# Patient Record
Sex: FEMALE | Race: WHITE | HISPANIC OR LATINO | Employment: FULL TIME | URBAN - METROPOLITAN AREA
[De-identification: names, ages, dates, MRNs, and addresses within clinical notes are randomized per-mention and may not be internally consistent; named-entity substitution may affect disease eponyms.]

---

## 2022-09-15 DIAGNOSIS — F43.22 ADJUSTMENT DISORDER WITH ANXIETY: ICD-10-CM

## 2022-09-15 RX ORDER — ALPRAZOLAM 0.25 MG/1
0.25 TABLET ORAL DAILY PRN
Qty: 30 TABLET | Refills: 0 | Status: SHIPPED | OUTPATIENT
Start: 2022-09-15 | End: 2023-01-23

## 2023-01-22 DIAGNOSIS — F43.22 ADJUSTMENT DISORDER WITH ANXIETY: ICD-10-CM

## 2023-01-22 DIAGNOSIS — B00.9 HSV-1 (HERPES SIMPLEX VIRUS 1) INFECTION: ICD-10-CM

## 2023-01-23 DIAGNOSIS — B00.9 HSV-1 (HERPES SIMPLEX VIRUS 1) INFECTION: ICD-10-CM

## 2023-01-23 DIAGNOSIS — F43.22 ADJUSTMENT DISORDER WITH ANXIETY: ICD-10-CM

## 2023-01-23 DIAGNOSIS — L25.5 RHUS DERMATITIS: ICD-10-CM

## 2023-01-23 DIAGNOSIS — E03.9 HYPOTHYROIDISM, UNSPECIFIED TYPE: ICD-10-CM

## 2023-01-23 RX ORDER — LEVOTHYROXINE SODIUM 112 UG/1
112 TABLET ORAL
Qty: 90 TABLET | Refills: 0 | Status: CANCELLED | OUTPATIENT
Start: 2023-01-23

## 2023-01-23 RX ORDER — ALPRAZOLAM 0.25 MG/1
TABLET ORAL
Qty: 30 TABLET | Refills: 0 | Status: SHIPPED | OUTPATIENT
Start: 2023-01-23

## 2023-01-23 RX ORDER — VALACYCLOVIR HYDROCHLORIDE 1 G/1
TABLET, FILM COATED ORAL
Qty: 30 TABLET | Refills: 0 | Status: CANCELLED | OUTPATIENT
Start: 2023-01-23 | End: 2023-02-22

## 2023-01-23 RX ORDER — ALPRAZOLAM 0.25 MG/1
0.25 TABLET ORAL DAILY PRN
Qty: 30 TABLET | Refills: 0 | Status: CANCELLED | OUTPATIENT
Start: 2023-01-23

## 2023-01-23 RX ORDER — VALACYCLOVIR HYDROCHLORIDE 1 G/1
TABLET, FILM COATED ORAL
Qty: 30 TABLET | Refills: 0 | Status: SHIPPED | OUTPATIENT
Start: 2023-01-23 | End: 2023-02-22

## 2023-01-26 ENCOUNTER — HOSPITAL ENCOUNTER (OUTPATIENT)
Dept: GASTROENTEROLOGY | Facility: AMBULARY SURGERY CENTER | Age: 43
Setting detail: OUTPATIENT SURGERY
End: 2023-01-26
Attending: INTERNAL MEDICINE

## 2023-01-26 ENCOUNTER — ANESTHESIA EVENT (OUTPATIENT)
Dept: GASTROENTEROLOGY | Facility: AMBULARY SURGERY CENTER | Age: 43
End: 2023-01-26

## 2023-01-26 ENCOUNTER — ANESTHESIA (OUTPATIENT)
Dept: GASTROENTEROLOGY | Facility: AMBULARY SURGERY CENTER | Age: 43
End: 2023-01-26

## 2023-01-26 VITALS
BODY MASS INDEX: 38.8 KG/M2 | OXYGEN SATURATION: 99 % | RESPIRATION RATE: 18 BRPM | TEMPERATURE: 99 F | SYSTOLIC BLOOD PRESSURE: 138 MMHG | DIASTOLIC BLOOD PRESSURE: 82 MMHG | HEART RATE: 78 BPM | WEIGHT: 219 LBS | HEIGHT: 63 IN

## 2023-01-26 DIAGNOSIS — Z12.11 SCREENING FOR COLON CANCER: ICD-10-CM

## 2023-01-26 RX ORDER — LIDOCAINE HYDROCHLORIDE 10 MG/ML
INJECTION, SOLUTION EPIDURAL; INFILTRATION; INTRACAUDAL; PERINEURAL AS NEEDED
Status: DISCONTINUED | OUTPATIENT
Start: 2023-01-26 | End: 2023-01-26

## 2023-01-26 RX ORDER — PROPOFOL 10 MG/ML
INJECTION, EMULSION INTRAVENOUS AS NEEDED
Status: DISCONTINUED | OUTPATIENT
Start: 2023-01-26 | End: 2023-01-26

## 2023-01-26 RX ORDER — SODIUM CHLORIDE, SODIUM LACTATE, POTASSIUM CHLORIDE, CALCIUM CHLORIDE 600; 310; 30; 20 MG/100ML; MG/100ML; MG/100ML; MG/100ML
INJECTION, SOLUTION INTRAVENOUS CONTINUOUS PRN
Status: DISCONTINUED | OUTPATIENT
Start: 2023-01-26 | End: 2023-01-26

## 2023-01-26 RX ADMIN — PROPOFOL 100 MG: 10 INJECTION, EMULSION INTRAVENOUS at 13:14

## 2023-01-26 RX ADMIN — PROPOFOL 50 MG: 10 INJECTION, EMULSION INTRAVENOUS at 13:29

## 2023-01-26 RX ADMIN — SODIUM CHLORIDE, SODIUM LACTATE, POTASSIUM CHLORIDE, AND CALCIUM CHLORIDE: .6; .31; .03; .02 INJECTION, SOLUTION INTRAVENOUS at 13:10

## 2023-01-26 RX ADMIN — PROPOFOL 50 MG: 10 INJECTION, EMULSION INTRAVENOUS at 13:23

## 2023-01-26 RX ADMIN — PROPOFOL 50 MG: 10 INJECTION, EMULSION INTRAVENOUS at 13:17

## 2023-01-26 RX ADMIN — LIDOCAINE HYDROCHLORIDE 50 MG: 10 INJECTION, SOLUTION EPIDURAL; INFILTRATION; INTRACAUDAL at 13:14

## 2023-01-26 RX ADMIN — PROPOFOL 50 MG: 10 INJECTION, EMULSION INTRAVENOUS at 13:26

## 2023-01-26 RX ADMIN — PROPOFOL 50 MG: 10 INJECTION, EMULSION INTRAVENOUS at 13:32

## 2023-01-26 RX ADMIN — PROPOFOL 50 MG: 10 INJECTION, EMULSION INTRAVENOUS at 13:20

## 2023-01-26 NOTE — H&P
History and Physical -  [facility]  Tiffany Parag 38 y o  female MRN: [id number]                  HPI: Regla Marte is a 38y o  year old female who presents for open access screening colonoscopy  REVIEW OF SYSTEMS: Per the HPI, and otherwise unremarkable      Historical Information   Past Medical History:   Diagnosis Date   • Anxiety    • Hypothyroidism    • Low back pain    • Lyme disease    • Migraine    • Obesity    • Polycystic ovarian syndrome    • Tachycardia    • Varicella    • Vertigo    • Viral gastroenteritis      Past Surgical History:   Procedure Laterality Date   • APPENDECTOMY     • SHOULDER SURGERY Left     with hardware   • TOOTH EXTRACTION       Social History   Social History     Substance and Sexual Activity   Alcohol Use Yes    Comment: socially     Social History     Substance and Sexual Activity   Drug Use Never     Social History     Tobacco Use   Smoking Status Never   Smokeless Tobacco Never     Family History   Problem Relation Age of Onset   • Hypertension Brother    • Hyperlipidemia Brother    • Diabetes Maternal Grandmother    • Diabetes Paternal Grandmother    • Autism spectrum disorder Family    • Bipolar disorder Family    • Diabetes Family    • Varicose Veins Family    • Hypertension Mother    • No Known Problems Son    • No Known Problems Son    • No Known Problems Brother        Meds/Allergies       Current Outpatient Medications:   •  ALPRAZolam (XANAX) 0 25 mg tablet  •  levothyroxine 112 mcg tablet  •  valACYclovir (VALTREX) 1,000 mg tablet  •  brompheniramine-pseudoephedrine-DM 30-2-10 MG/5ML syrup  •  methylPREDNISolone 4 MG tablet therapy pack  •  triamcinolone (KENALOG) 0 1 % cream    Allergies   Allergen Reactions   • Shellfish-Derived Products - Food Allergy GI Intolerance       Objective     /91   Pulse 91   Temp (!) 97 3 °F (36 3 °C) (Temporal)   Resp 17   Ht 5' 3" (1 6 m)   Wt 99 3 kg (219 lb)   SpO2 96%   BMI 38 79 kg/m² PHYSICAL EXAM    Gen: NAD  Head: NCAT  CV: RRR  CHEST: Clear  ABD: soft, NT/ND  EXT: no edema      ASSESSMENT/PLAN:  This is a 38y o  year old female here for colonoscopy, and she is stable and optimized for her procedure

## 2023-01-26 NOTE — ANESTHESIA POSTPROCEDURE EVALUATION
Post-Op Assessment Note    CV Status:  Stable  Pain Score: 0    Pain management: adequate     Mental Status:  Alert and awake   Hydration Status:  Euvolemic   PONV Controlled:  Controlled   Airway Patency:  Patent      Post Op Vitals Reviewed: Yes      Staff: Anesthesiologist, CRNA         No notable events documented      /73 (01/26/23 1334)    Temp 99 °F (37 2 °C) (01/26/23 1334)    Pulse 88 (01/26/23 1334)   Resp 22 (01/26/23 1334)    SpO2 97 % (01/26/23 1334)

## 2023-02-15 ENCOUNTER — HOSPITAL ENCOUNTER (OUTPATIENT)
Dept: MAMMOGRAPHY | Facility: HOSPITAL | Age: 43
Discharge: HOME | End: 2023-02-15

## 2023-02-15 VITALS — WEIGHT: 218.92 LBS | BODY MASS INDEX: 38.79 KG/M2 | HEIGHT: 63 IN

## 2023-02-15 DIAGNOSIS — Z12.31 ENCOUNTER FOR SCREENING MAMMOGRAM FOR MALIGNANT NEOPLASM OF BREAST: ICD-10-CM

## 2023-02-22 ENCOUNTER — TELEPHONE (OUTPATIENT)
Dept: OBGYN CLINIC | Facility: MEDICAL CENTER | Age: 43
End: 2023-02-22

## 2023-02-22 ENCOUNTER — TELEPHONE (OUTPATIENT)
Dept: FAMILY MEDICINE CLINIC | Facility: CLINIC | Age: 43
End: 2023-02-22

## 2023-02-22 NOTE — TELEPHONE ENCOUNTER
Pt needs f/u There are no mammo recommendations for this study. and does not want to wait 3 weeks.  I gave her breast center #

## 2023-02-22 NOTE — TELEPHONE ENCOUNTER
PT hasn't heard back with test results yet. Is scheduled for a mammogram but the appt isn't until the end of March. She is concerned with the wait time and wants to speak with someone about her results.

## 2023-03-06 DIAGNOSIS — F43.22 ADJUSTMENT DISORDER WITH ANXIETY: ICD-10-CM

## 2023-03-06 RX ORDER — ALPRAZOLAM 0.25 MG/1
0.25 TABLET ORAL DAILY PRN
Qty: 30 TABLET | Refills: 0 | Status: SHIPPED | OUTPATIENT
Start: 2023-03-06

## 2023-03-06 NOTE — TELEPHONE ENCOUNTER
Reviewed patient's chart to fill for PCP who is not currently in the office  After review of September note, there was a recommendation to hold refills on the medication  There was a refill on 1/23/2023 without any other recommendations  I will give patient 30 days worth of medication, this should be followed up with PCP after

## 2023-03-13 NOTE — ANESTHESIA PREPROCEDURE EVALUATION
Procedure:  COLONOSCOPY    Relevant Problems   CARDIO   (+) Mixed hyperlipidemia      ENDO   (+) Hypothyroidism      Obesity (BMI 38)  Physical Exam    Airway    Mallampati score: II  TM Distance: >3 FB  Neck ROM: full     Dental       Cardiovascular      Pulmonary      Other Findings        Anesthesia Plan  ASA Score- 2     Anesthesia Type- IV sedation with anesthesia with ASA Monitors  Additional Monitors:   Airway Plan:           Plan Factors-Exercise tolerance (METS): >4 METS  Chart reviewed  Existing labs reviewed  Patient summary reviewed  Induction- intravenous  Postoperative Plan-     Informed Consent- Anesthetic plan and risks discussed with patient  I personally reviewed this patient with the CRNA  Discussed and agreed on the Anesthesia Plan with the CRNA  Berry Stanley

## 2023-03-29 ENCOUNTER — HOSPITAL ENCOUNTER (OUTPATIENT)
Dept: ULTRASOUND IMAGING | Facility: CLINIC | Age: 43
Discharge: HOME | End: 2023-03-29

## 2023-03-29 ENCOUNTER — HOSPITAL ENCOUNTER (OUTPATIENT)
Dept: MAMMOGRAPHY | Facility: CLINIC | Age: 43
Discharge: HOME | End: 2023-03-29

## 2023-03-29 VITALS — WEIGHT: 219 LBS | BODY MASS INDEX: 38.8 KG/M2 | HEIGHT: 63 IN

## 2023-03-29 DIAGNOSIS — R92.8 ABNORMAL MAMMOGRAM: ICD-10-CM

## 2023-05-17 DIAGNOSIS — F43.22 ADJUSTMENT DISORDER WITH ANXIETY: ICD-10-CM

## 2023-05-17 NOTE — TELEPHONE ENCOUNTER
Left a detailed message for patient to call back and schedule a follow up for controlled medication

## 2023-05-19 DIAGNOSIS — E03.9 HYPOTHYROIDISM, UNSPECIFIED TYPE: Primary | ICD-10-CM

## 2023-05-19 DIAGNOSIS — E78.2 MIXED HYPERLIPIDEMIA: ICD-10-CM

## 2023-05-19 RX ORDER — ALPRAZOLAM 0.25 MG/1
0.25 TABLET ORAL DAILY PRN
Qty: 30 TABLET | Refills: 0 | OUTPATIENT
Start: 2023-05-19

## 2023-05-23 NOTE — TELEPHONE ENCOUNTER
Patient called back and scheduled for the next available appointment - 6/7  She would like to know if it is possible to have a short supply to last until her visit

## 2023-05-24 DIAGNOSIS — F43.22 ADJUSTMENT DISORDER WITH ANXIETY: ICD-10-CM

## 2023-05-24 RX ORDER — ALPRAZOLAM 0.25 MG/1
0.25 TABLET ORAL DAILY PRN
Qty: 30 TABLET | Refills: 0 | Status: SHIPPED | OUTPATIENT
Start: 2023-05-24 | End: 2023-06-07 | Stop reason: SDUPTHER

## 2023-06-07 ENCOUNTER — OFFICE VISIT (OUTPATIENT)
Dept: FAMILY MEDICINE CLINIC | Facility: CLINIC | Age: 43
End: 2023-06-07
Payer: COMMERCIAL

## 2023-06-07 VITALS
DIASTOLIC BLOOD PRESSURE: 82 MMHG | OXYGEN SATURATION: 98 % | HEART RATE: 92 BPM | WEIGHT: 223 LBS | HEIGHT: 63 IN | SYSTOLIC BLOOD PRESSURE: 125 MMHG | BODY MASS INDEX: 39.51 KG/M2

## 2023-06-07 DIAGNOSIS — F43.22 ADJUSTMENT DISORDER WITH ANXIETY: Primary | ICD-10-CM

## 2023-06-07 DIAGNOSIS — E03.9 HYPOTHYROIDISM, UNSPECIFIED TYPE: ICD-10-CM

## 2023-06-07 DIAGNOSIS — E78.2 MIXED HYPERLIPIDEMIA: ICD-10-CM

## 2023-06-07 DIAGNOSIS — R73.9 ELEVATED BLOOD SUGAR: ICD-10-CM

## 2023-06-07 PROCEDURE — 99213 OFFICE O/P EST LOW 20 MIN: CPT | Performed by: FAMILY MEDICINE

## 2023-06-07 RX ORDER — ALPRAZOLAM 0.25 MG/1
0.25 TABLET ORAL DAILY PRN
Qty: 30 TABLET | Refills: 2 | Status: SHIPPED | OUTPATIENT
Start: 2023-06-07

## 2023-06-07 NOTE — PROGRESS NOTES
Subjective:      Patient ID: Heidi Shaw is a 37 y o  female  Thyroid Problem  Presents for follow-up visit  Patient reports no palpitations or tremors  Symptom course: On levothyroxine 112 mcg  Anxiety  Presents for follow-up visit  Patient reports no excessive worry, palpitations or panic  Symptoms occur occasionally  Compliance with medications:  Xanax 0 25 mg nightly as needed  Past Medical History:   Diagnosis Date   • Anxiety    • Hypothyroidism    • Low back pain    • Lyme disease    • Migraine    • Obesity    • Polycystic ovarian syndrome    • Tachycardia    • Varicella    • Vertigo    • Viral gastroenteritis        Family History   Problem Relation Age of Onset   • Hypertension Mother    • Diabetes Maternal Grandmother    • Diabetes Paternal Grandmother    • Hypertension Brother    • Hyperlipidemia Brother    • No Known Problems Brother    • No Known Problems Son    • No Known Problems Son    • No Known Problems Maternal Aunt    • No Known Problems Maternal Aunt    • No Known Problems Maternal Aunt    • No Known Problems Maternal Aunt    • No Known Problems Maternal Aunt    • Autism spectrum disorder Family    • Bipolar disorder Family    • Diabetes Family    • Varicose Veins Family        Past Surgical History:   Procedure Laterality Date   • APPENDECTOMY     • SHOULDER SURGERY Left     with hardware   • TOOTH EXTRACTION          reports that she has never smoked  She has never used smokeless tobacco  She reports current alcohol use  She reports that she does not use drugs        Current Outpatient Medications:   •  ALPRAZolam (XANAX) 0 25 mg tablet, Take 1 tablet (0 25 mg total) by mouth daily as needed for anxiety, Disp: 30 tablet, Rfl: 2  •  levothyroxine 112 mcg tablet, TAKE 1 TABLET (112 MCG TOTAL) BY MOUTH DAILY IN THE EARLY MORNING, Disp: 90 tablet, Rfl: 0  •  triamcinolone (KENALOG) 0 1 % cream, Apply topically 2 (two) times a day (Patient not taking: Reported on 9/1/2022), Disp: "30 g, Rfl: 0  •  valACYclovir (VALTREX) 1,000 mg tablet, TAKE 1 TABLET BY MOUTH TWICE A DAY FOR 3 DAYS, Disp: 30 tablet, Rfl: 0    The following portions of the patient's history were reviewed and updated as appropriate: allergies, current medications, past family history, past medical history, past social history, past surgical history and problem list     Review of Systems   Cardiovascular: Negative for palpitations  Neurological: Negative for tremors  Objective:    /82   Pulse 92   Ht 5' 3\" (1 6 m)   Wt 101 kg (223 lb)   SpO2 98%   BMI 39 50 kg/m²      Physical Exam  Constitutional:       Appearance: Normal appearance  Cardiovascular:      Heart sounds: Normal heart sounds  Pulmonary:      Breath sounds: Normal breath sounds  Neurological:      Mental Status: She is oriented to person, place, and time  Psychiatric:         Mood and Affect: Mood normal            No results found for this or any previous visit (from the past 1008 hour(s))  Assessment/Plan:             Problem List Items Addressed This Visit        Endocrine    Hypothyroidism     Patient is due for metabolic labs including TSH  Currently on levothyroxine 112 mcg  We will continue same  Patient will be contacted with the results and medication dose adjustment if needed  Relevant Orders    TSH, 3rd generation with Free T4 reflex       Other    Adjustment disorder with anxiety - Primary     Symptoms responding well to intermittent use of Xanax 0 25 milligram   Continue same  PDMP checked  Medication refill called in  Relevant Medications    ALPRAZolam (XANAX) 0 25 mg tablet    Elevated blood sugar    Relevant Orders    Hemoglobin A1C    Comprehensive metabolic panel    Hemoglobin A1C    Mixed hyperlipidemia     Patient due for labs  Encouraged to continue with low-fat diet/exercise    Continue monitoring with metabolic panel at 6-month interval          Relevant Orders    Lipid panel       "

## 2023-06-07 NOTE — ASSESSMENT & PLAN NOTE
Symptoms responding well to intermittent use of Xanax 0 25 milligram   Continue same  PDMP checked  Medication refill called in

## 2023-06-07 NOTE — ASSESSMENT & PLAN NOTE
Patient due for labs  Encouraged to continue with low-fat diet/exercise    Continue monitoring with metabolic panel at 6-month interval

## 2023-06-07 NOTE — ASSESSMENT & PLAN NOTE
Patient is due for metabolic labs including TSH  Currently on levothyroxine 112 mcg  We will continue same  Patient will be contacted with the results and medication dose adjustment if needed

## 2023-06-09 ENCOUNTER — TELEPHONE (OUTPATIENT)
Dept: FAMILY MEDICINE CLINIC | Facility: CLINIC | Age: 43
End: 2023-06-09

## 2023-06-09 NOTE — TELEPHONE ENCOUNTER
Luisa Singh said she spoke to her pharmacy and they had sent a request for an alternative for her Xanax because of a dosage issue.

## 2023-06-12 DIAGNOSIS — F43.22 ADJUSTMENT DISORDER WITH ANXIETY: ICD-10-CM

## 2023-06-12 RX ORDER — ALPRAZOLAM 0.25 MG/1
0.25 TABLET ORAL DAILY PRN
Qty: 30 TABLET | Refills: 0 | Status: CANCELLED | OUTPATIENT
Start: 2023-06-12

## 2023-06-20 ENCOUNTER — TELEPHONE (OUTPATIENT)
Dept: FAMILY MEDICINE CLINIC | Facility: CLINIC | Age: 43
End: 2023-06-20

## 2023-06-20 ENCOUNTER — OFFICE VISIT (OUTPATIENT)
Dept: URGENT CARE | Facility: CLINIC | Age: 43
End: 2023-06-20
Payer: COMMERCIAL

## 2023-06-20 ENCOUNTER — APPOINTMENT (OUTPATIENT)
Dept: LAB | Facility: CLINIC | Age: 43
End: 2023-06-20
Payer: COMMERCIAL

## 2023-06-20 VITALS
SYSTOLIC BLOOD PRESSURE: 134 MMHG | DIASTOLIC BLOOD PRESSURE: 83 MMHG | HEIGHT: 63 IN | RESPIRATION RATE: 16 BRPM | OXYGEN SATURATION: 99 % | WEIGHT: 220 LBS | TEMPERATURE: 98.7 F | BODY MASS INDEX: 38.98 KG/M2

## 2023-06-20 DIAGNOSIS — E78.2 MIXED HYPERLIPIDEMIA: ICD-10-CM

## 2023-06-20 DIAGNOSIS — R73.9 ELEVATED BLOOD SUGAR: ICD-10-CM

## 2023-06-20 DIAGNOSIS — E03.9 HYPOTHYROIDISM, UNSPECIFIED TYPE: ICD-10-CM

## 2023-06-20 DIAGNOSIS — L30.9 DERMATITIS: Primary | ICD-10-CM

## 2023-06-20 LAB
ALBUMIN SERPL BCP-MCNC: 4 G/DL (ref 3.5–5)
ALP SERPL-CCNC: 69 U/L (ref 34–104)
ALT SERPL W P-5'-P-CCNC: 17 U/L (ref 7–52)
ANION GAP SERPL CALCULATED.3IONS-SCNC: 5 MMOL/L
AST SERPL W P-5'-P-CCNC: 13 U/L (ref 13–39)
BILIRUB SERPL-MCNC: 0.32 MG/DL (ref 0.2–1)
BUN SERPL-MCNC: 11 MG/DL (ref 5–25)
CALCIUM SERPL-MCNC: 8.8 MG/DL (ref 8.4–10.2)
CHLORIDE SERPL-SCNC: 107 MMOL/L (ref 96–108)
CHOLEST SERPL-MCNC: 197 MG/DL
CO2 SERPL-SCNC: 26 MMOL/L (ref 21–32)
CREAT SERPL-MCNC: 0.68 MG/DL (ref 0.6–1.3)
GFR SERPL CREATININE-BSD FRML MDRD: 105 ML/MIN/1.73SQ M
GLUCOSE P FAST SERPL-MCNC: 100 MG/DL (ref 65–99)
HDLC SERPL-MCNC: 53 MG/DL
LDLC SERPL CALC-MCNC: 119 MG/DL (ref 0–100)
NONHDLC SERPL-MCNC: 144 MG/DL
POTASSIUM SERPL-SCNC: 4.4 MMOL/L (ref 3.5–5.3)
PROT SERPL-MCNC: 6.7 G/DL (ref 6.4–8.4)
SODIUM SERPL-SCNC: 138 MMOL/L (ref 135–147)
TRIGL SERPL-MCNC: 123 MG/DL
TSH SERPL DL<=0.05 MIU/L-ACNC: 4.21 UIU/ML (ref 0.45–4.5)

## 2023-06-20 PROCEDURE — 83036 HEMOGLOBIN GLYCOSYLATED A1C: CPT

## 2023-06-20 PROCEDURE — 80061 LIPID PANEL: CPT

## 2023-06-20 PROCEDURE — 36415 COLL VENOUS BLD VENIPUNCTURE: CPT

## 2023-06-20 PROCEDURE — 99213 OFFICE O/P EST LOW 20 MIN: CPT

## 2023-06-20 PROCEDURE — 80053 COMPREHEN METABOLIC PANEL: CPT

## 2023-06-20 PROCEDURE — 84443 ASSAY THYROID STIM HORMONE: CPT

## 2023-06-20 RX ORDER — METHYLPREDNISOLONE 4 MG/1
TABLET ORAL
Qty: 21 TABLET | Refills: 0 | Status: SHIPPED | OUTPATIENT
Start: 2023-06-20

## 2023-06-20 NOTE — TELEPHONE ENCOUNTER
----- Message from Dk Serna MD sent at 6/20/2023 12:51 PM EDT -----  Please call patient with results  Lipid panel has improved  Thyroid stable  Continue with the current dose of levothyroxine  A1c is pending

## 2023-06-20 NOTE — PATIENT INSTRUCTIONS
Steroids will help reduce inflammation    Follow up with PCP in 3-5 days  Proceed to ER if symptoms worsen

## 2023-06-20 NOTE — PROGRESS NOTES
[facility]        NAME: Abimbola Victor is a 37 y o  female  : 1980    MRN: [id number]  DATE: 2023  TIME: 1:29 PM    Assessment and Plan   Dermatitis [L30 9]  1  Dermatitis  methylPREDNISolone 4 MG tablet therapy pack            Patient Instructions   Steroids will help reduce inflammation    Follow up with PCP in 3-5 days  Proceed to ER if symptoms worsen  Chief Complaint     Chief Complaint   Patient presents with   • Rash     Rash on hands and face- started Saturday  No yard work  No new products used OTC benadryl gel          History of Present Illness       Rash starting on hands about 3 days ago  Reports no specific instance where she was exposed to an allergen  States since then it had spread to her face and her fingers became more swollen  States she had taken benadryl last evening and also cream on hands this morning without relief  Review of Systems   Review of Systems   Constitutional: Negative for chills and fever  HENT: Negative for ear pain and sore throat  Eyes: Negative for pain and visual disturbance  Respiratory: Negative for cough and shortness of breath  Cardiovascular: Negative for chest pain and palpitations  Gastrointestinal: Negative for abdominal pain and vomiting  Genitourinary: Negative for dysuria and hematuria  Musculoskeletal: Negative for arthralgias and back pain  Skin: Positive for rash  Negative for color change  Neurological: Negative for dizziness, seizures, syncope, weakness and numbness  All other systems reviewed and are negative          Current Medications       Current Outpatient Medications:   •  ALPRAZolam (XANAX) 0 25 mg tablet, Take 1 tablet (0 25 mg total) by mouth daily as needed for anxiety, Disp: 30 tablet, Rfl: 2  •  levothyroxine 112 mcg tablet, TAKE 1 TABLET (112 MCG TOTAL) BY MOUTH DAILY IN THE EARLY MORNING, Disp: 90 tablet, Rfl: 0  •  methylPREDNISolone 4 MG tablet therapy pack, There are 21 total tablets: "6 tablets on day 1, 5 tablets on day 2, 4 tablets on day 3, 3 tablets on day 4, 2 tablets on day 5, 1 tablet on day 6 , Disp: 21 tablet, Rfl: 0  •  triamcinolone (KENALOG) 0 1 % cream, Apply topically 2 (two) times a day (Patient not taking: Reported on 9/1/2022), Disp: 30 g, Rfl: 0  •  valACYclovir (VALTREX) 1,000 mg tablet, TAKE 1 TABLET BY MOUTH TWICE A DAY FOR 3 DAYS, Disp: 30 tablet, Rfl: 0    Current Allergies     Allergies as of 06/20/2023 - Reviewed 06/20/2023   Allergen Reaction Noted   • Shellfish-derived products - food allergy GI Intolerance 01/22/2021            The following portions of the patient's history were reviewed and updated as appropriate: allergies, current medications, past family history, past medical history, past social history, past surgical history and problem list      Past Medical History:   Diagnosis Date   • Anxiety    • Hypothyroidism    • Low back pain    • Lyme disease    • Migraine    • Obesity    • Polycystic ovarian syndrome    • Tachycardia    • Varicella    • Vertigo    • Viral gastroenteritis        Past Surgical History:   Procedure Laterality Date   • APPENDECTOMY     • SHOULDER SURGERY Left     with hardware   • TOOTH EXTRACTION         Family History   Problem Relation Age of Onset   • Hypertension Mother    • Diabetes Maternal Grandmother    • Diabetes Paternal Grandmother    • Hypertension Brother    • Hyperlipidemia Brother    • No Known Problems Brother    • No Known Problems Son    • No Known Problems Son    • No Known Problems Maternal Aunt    • No Known Problems Maternal Aunt    • No Known Problems Maternal Aunt    • No Known Problems Maternal Aunt    • No Known Problems Maternal Aunt    • Autism spectrum disorder Family    • Bipolar disorder Family    • Diabetes Family    • Varicose Veins Family          Medications have been verified          Objective   /83   Temp 98 7 °F (37 1 °C)   Resp 16   Ht 5' 3\" (1 6 m)   Wt 99 8 kg (220 lb)   SpO2 99%   BMI " 38 97 kg/m²   No LMP recorded  Physical Exam     Physical Exam  Constitutional:       Appearance: Normal appearance  Pulmonary:      Effort: Pulmonary effort is normal    Skin:     General: Skin is warm and dry  Capillary Refill: Capillary refill takes less than 2 seconds  Findings: Rash present  Rash is papular  Neurological:      General: No focal deficit present  Mental Status: She is alert and oriented to person, place, and time  Mental status is at baseline  Motor: No weakness     Psychiatric:         Mood and Affect: Mood normal          Behavior: Behavior normal

## 2023-06-21 LAB
EST. AVERAGE GLUCOSE BLD GHB EST-MCNC: 105 MG/DL
HBA1C MFR BLD: 5.3 %

## 2023-07-17 ENCOUNTER — OFFICE VISIT (OUTPATIENT)
Dept: URGENT CARE | Facility: CLINIC | Age: 43
End: 2023-07-17
Payer: COMMERCIAL

## 2023-07-17 VITALS
BODY MASS INDEX: 38.97 KG/M2 | SYSTOLIC BLOOD PRESSURE: 129 MMHG | TEMPERATURE: 97.3 F | HEART RATE: 102 BPM | WEIGHT: 220 LBS | OXYGEN SATURATION: 95 % | DIASTOLIC BLOOD PRESSURE: 90 MMHG | RESPIRATION RATE: 15 BRPM

## 2023-07-17 DIAGNOSIS — L23.7 POISON IVY: Primary | ICD-10-CM

## 2023-07-17 PROCEDURE — 99213 OFFICE O/P EST LOW 20 MIN: CPT | Performed by: NURSE PRACTITIONER

## 2023-07-17 RX ORDER — PREDNISONE 20 MG/1
40 TABLET ORAL DAILY
Qty: 10 TABLET | Refills: 0 | Status: SHIPPED | OUTPATIENT
Start: 2023-07-17

## 2023-07-17 NOTE — PROGRESS NOTES
[facility]        NAME: Errol Robbins is a 37 y.o. female  : 1980    MRN: [id number]  DATE: 2023  TIME: 8:52 AM    Assessment and Plan   Poison ivy [L23.7]  1. Poison ivy  predniSONE 20 mg tablet            Patient Instructions       Follow up with PCP in 3-5 days. Proceed to  ER if symptoms worsen. Chief Complaint     Chief Complaint   Patient presents with   • Rash     Pt has several areas that look like bite marks-she also has some areas that have vesicles and she is c/o swelling in her right ear from the rash. History of Present Illness       Patient is a 47-year-old female presenting with rash on the left forearm that started Saturday night. This morning she feels the rashes on her neck. Her eyelids are puffy and itchy and the inside of her ear canals feel itchy. Denies fever, chills, or difficulty breathing. She was pulling weeds on Saturday prior to onset. She washed with Zanfel, applied Benadryl topically and took Benadryl pills without improvement. Rash  Pertinent negatives include no congestion, cough, fever or shortness of breath. Review of Systems   Review of Systems   Constitutional: Negative for activity change, chills and fever. HENT: Positive for facial swelling (eyelids, ear canals). Negative for congestion, ear discharge and ear pain. Eyes:        B/l upper and lower eyelid puffiness     Respiratory: Negative for cough and shortness of breath. Skin: Positive for rash. Neurological: Negative for headaches.          Current Medications       Current Outpatient Medications:   •  ALPRAZolam (XANAX) 0.25 mg tablet, Take 1 tablet (0.25 mg total) by mouth daily as needed for anxiety, Disp: 30 tablet, Rfl: 2  •  levothyroxine 112 mcg tablet, TAKE 1 TABLET (112 MCG TOTAL) BY MOUTH DAILY IN THE EARLY MORNING, Disp: 90 tablet, Rfl: 0  •  predniSONE 20 mg tablet, Take 2 tablets (40 mg total) by mouth daily, Disp: 10 tablet, Rfl: 0  • methylPREDNISolone 4 MG tablet therapy pack, There are 21 total tablets: 6 tablets on day 1, 5 tablets on day 2, 4 tablets on day 3, 3 tablets on day 4, 2 tablets on day 5, 1 tablet on day 6.  (Patient not taking: Reported on 7/17/2023), Disp: 21 tablet, Rfl: 0  •  triamcinolone (KENALOG) 0.1 % cream, Apply topically 2 (two) times a day (Patient not taking: Reported on 9/1/2022), Disp: 30 g, Rfl: 0  •  valACYclovir (VALTREX) 1,000 mg tablet, TAKE 1 TABLET BY MOUTH TWICE A DAY FOR 3 DAYS, Disp: 30 tablet, Rfl: 0    Current Allergies     Allergies as of 07/17/2023 - Reviewed 07/17/2023   Allergen Reaction Noted   • Shellfish-derived products - food allergy GI Intolerance 01/22/2021            The following portions of the patient's history were reviewed and updated as appropriate: allergies, current medications, past family history, past medical history, past social history, past surgical history and problem list.     Past Medical History:   Diagnosis Date   • Anxiety    • Hypothyroidism    • Low back pain    • Lyme disease    • Migraine    • Obesity    • Polycystic ovarian syndrome    • Tachycardia    • Varicella    • Vertigo    • Viral gastroenteritis        Past Surgical History:   Procedure Laterality Date   • APPENDECTOMY     • SHOULDER SURGERY Left     with hardware   • TOOTH EXTRACTION         Family History   Problem Relation Age of Onset   • Hypertension Mother    • Diabetes Maternal Grandmother    • Diabetes Paternal Grandmother    • Hypertension Brother    • Hyperlipidemia Brother    • No Known Problems Brother    • No Known Problems Son    • No Known Problems Son    • No Known Problems Maternal Aunt    • No Known Problems Maternal Aunt    • No Known Problems Maternal Aunt    • No Known Problems Maternal Aunt    • No Known Problems Maternal Aunt    • Autism spectrum disorder Family    • Bipolar disorder Family    • Diabetes Family    • Varicose Veins Family          Medications have been verified. Objective   /90   Pulse 102   Temp (!) 97.3 °F (36.3 °C)   Resp 15   Wt 99.8 kg (220 lb)   SpO2 95%   BMI 38.97 kg/m²        Physical Exam     Physical Exam  Vitals reviewed. Constitutional:       General: She is awake. She is not in acute distress. Appearance: Normal appearance. Cardiovascular:      Rate and Rhythm: Regular rhythm. Tachycardia present. Heart sounds: Normal heart sounds, S1 normal and S2 normal.   Pulmonary:      Effort: Pulmonary effort is normal.      Breath sounds: Normal breath sounds. No decreased breath sounds, wheezing or rhonchi. Skin:     Comments: Left dorsal forearm with vesicles in a linear formation. Mild surrounding erythema. Bilateral upper and lower eyelid swelling. Stephanie of bilateral ears is red and inflamed. Ear canals are within normal limits. Neurological:      Mental Status: She is alert. Psychiatric:         Behavior: Behavior is cooperative.

## 2023-08-07 DIAGNOSIS — E03.9 HYPOTHYROIDISM, UNSPECIFIED TYPE: ICD-10-CM

## 2023-08-07 RX ORDER — LEVOTHYROXINE SODIUM 112 UG/1
112 TABLET ORAL
Qty: 30 TABLET | Refills: 2 | Status: SHIPPED | OUTPATIENT
Start: 2023-08-07
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
## 2023-08-31 ENCOUNTER — ANNUAL EXAM (OUTPATIENT)
Dept: OBGYN CLINIC | Facility: MEDICAL CENTER | Age: 43
End: 2023-08-31
Payer: COMMERCIAL

## 2023-08-31 VITALS
WEIGHT: 225 LBS | HEIGHT: 63 IN | SYSTOLIC BLOOD PRESSURE: 122 MMHG | BODY MASS INDEX: 39.87 KG/M2 | DIASTOLIC BLOOD PRESSURE: 84 MMHG

## 2023-08-31 DIAGNOSIS — Z12.31 ENCOUNTER FOR SCREENING MAMMOGRAM FOR BREAST CANCER: ICD-10-CM

## 2023-08-31 DIAGNOSIS — Z01.419 ENCNTR FOR GYN EXAM (GENERAL) (ROUTINE) W/O ABN FINDINGS: Primary | ICD-10-CM

## 2023-08-31 DIAGNOSIS — N63.12 MASS OF UPPER INNER QUADRANT OF RIGHT BREAST: ICD-10-CM

## 2023-08-31 PROCEDURE — S0612 ANNUAL GYNECOLOGICAL EXAMINA: HCPCS | Performed by: NURSE PRACTITIONER

## 2023-09-29 ENCOUNTER — HOSPITAL ENCOUNTER (OUTPATIENT)
Dept: ULTRASOUND IMAGING | Facility: CLINIC | Age: 43
End: 2023-09-29
Payer: COMMERCIAL

## 2023-09-29 ENCOUNTER — HOSPITAL ENCOUNTER (OUTPATIENT)
Dept: MAMMOGRAPHY | Facility: CLINIC | Age: 43
End: 2023-09-29
Payer: COMMERCIAL

## 2023-09-29 VITALS — HEIGHT: 63 IN | WEIGHT: 225 LBS | BODY MASS INDEX: 39.87 KG/M2

## 2023-09-29 DIAGNOSIS — N63.12 MASS OF UPPER INNER QUADRANT OF RIGHT BREAST: ICD-10-CM

## 2023-09-29 DIAGNOSIS — R92.8 ABNORMAL MAMMOGRAM OF RIGHT BREAST: Primary | ICD-10-CM

## 2023-09-29 PROCEDURE — 76642 ULTRASOUND BREAST LIMITED: CPT

## 2023-09-29 PROCEDURE — 77065 DX MAMMO INCL CAD UNI: CPT

## 2023-09-29 PROCEDURE — G0279 TOMOSYNTHESIS, MAMMO: HCPCS
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
## 2023-10-02 ENCOUNTER — TELEPHONE (OUTPATIENT)
Dept: OBGYN CLINIC | Facility: CLINIC | Age: 43
End: 2023-10-02
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
## 2023-10-02 NOTE — TELEPHONE ENCOUNTER
lmtcb Mother  Still living? Unknown  Family history of diabetes mellitus, Age at diagnosis: Age Unknown

## 2023-10-16 ENCOUNTER — OFFICE VISIT (OUTPATIENT)
Dept: URGENT CARE | Facility: MEDICAL CENTER | Age: 43
End: 2023-10-16
Payer: COMMERCIAL

## 2023-10-16 VITALS
BODY MASS INDEX: 39.87 KG/M2 | RESPIRATION RATE: 20 BRPM | OXYGEN SATURATION: 99 % | WEIGHT: 225 LBS | HEIGHT: 63 IN | DIASTOLIC BLOOD PRESSURE: 86 MMHG | SYSTOLIC BLOOD PRESSURE: 144 MMHG | TEMPERATURE: 98.9 F | HEART RATE: 80 BPM

## 2023-10-16 DIAGNOSIS — J01.90 ACUTE SINUSITIS, RECURRENCE NOT SPECIFIED, UNSPECIFIED LOCATION: Primary | ICD-10-CM

## 2023-10-16 PROCEDURE — 99213 OFFICE O/P EST LOW 20 MIN: CPT | Performed by: PHYSICIAN ASSISTANT

## 2023-10-16 RX ORDER — METHYLPREDNISOLONE 4 MG/1
TABLET ORAL
Qty: 21 EACH | Refills: 0 | Status: SHIPPED | OUTPATIENT
Start: 2023-10-16

## 2023-10-16 RX ORDER — AMOXICILLIN AND CLAVULANATE POTASSIUM 875; 125 MG/1; MG/1
1 TABLET, FILM COATED ORAL EVERY 12 HOURS SCHEDULED
Qty: 20 TABLET | Refills: 0 | Status: SHIPPED | OUTPATIENT
Start: 2023-10-16 | End: 2023-10-26

## 2023-10-17 NOTE — PROGRESS NOTES
[facility]        NAME: Shravan Patel is a 37 y.o. female  : 1980    MRN: [id number]  DATE: 2023  TIME: 8:26 PM    Assessment and Plan   Acute sinusitis, recurrence not specified, unspecified location [J01.90]  1. Acute sinusitis, recurrence not specified, unspecified location  amoxicillin-clavulanate (AUGMENTIN) 875-125 mg per tablet    methylPREDNISolone 4 MG tablet therapy pack      2. Dermatitis              Patient Instructions     There are no Patient Instructions on file for this visit. **Portions of the record may have been created with voice recognition software. Occasional wrong word or "sound a like" substitutions may have occurred due to the inherent limitations of voice recognition software. Read the chart carefully and recognize, using context, where substitutions have occurred. **     Chief Complaint     Chief Complaint   Patient presents with   • Nasal Congestion     Facial pain, green nasal drainage, "pulsating" in face; x3 weeks; taking nasal spray and allergy medication with little relief          History of Present Illness     Pt presents for evaluation of sinus pain and pressure x 3 weeks. States she has tried otc remedies without much relief. Now has a pulsatile pressure feeling under her eyes. She does note she has used sinex nasal spray (afrin) for about 3 weeks. We discussed likely caused rebound congestion. Denies fever, cough, SOB        Review of Systems     Review of Systems   Constitutional:  Negative for chills, fatigue and fever. HENT:  Positive for congestion, sinus pressure and sinus pain. Negative for ear pain, sore throat and trouble swallowing. Eyes:  Negative for pain, discharge and redness. Respiratory:  Negative for cough, chest tightness, shortness of breath and wheezing. Cardiovascular:  Negative for chest pain, palpitations and leg swelling. Gastrointestinal:  Negative for abdominal pain, diarrhea, nausea and vomiting. Musculoskeletal:  Negative for arthralgias, joint swelling and myalgias. Skin:  Negative for rash. Neurological:  Negative for dizziness, weakness, numbness and headaches. Current Medications     Current Outpatient Medications:   •  ALPRAZolam (XANAX) 0.25 mg tablet, Take 1 tablet (0.25 mg total) by mouth daily as needed for anxiety, Disp: 30 tablet, Rfl: 2  •  amoxicillin-clavulanate (AUGMENTIN) 875-125 mg per tablet, Take 1 tablet by mouth every 12 (twelve) hours for 10 days, Disp: 20 tablet, Rfl: 0  •  levothyroxine 112 mcg tablet, TAKE 1 TABLET (112 MCG TOTAL) BY MOUTH DAILY IN THE EARLY MORNING, Disp: 90 tablet, Rfl: 1  •  methylPREDNISolone 4 MG tablet therapy pack, Use as directed on package, Disp: 21 each, Rfl: 0  •  methylPREDNISolone 4 MG tablet therapy pack, There are 21 total tablets: 6 tablets on day 1, 5 tablets on day 2, 4 tablets on day 3, 3 tablets on day 4, 2 tablets on day 5, 1 tablet on day 6.  (Patient not taking: Reported on 7/17/2023), Disp: 21 tablet, Rfl: 0  •  predniSONE 20 mg tablet, Take 2 tablets (40 mg total) by mouth daily (Patient not taking: Reported on 8/31/2023), Disp: 10 tablet, Rfl: 0  •  triamcinolone (KENALOG) 0.1 % cream, Apply topically 2 (two) times a day (Patient not taking: Reported on 9/1/2022), Disp: 30 g, Rfl: 0  •  valACYclovir (VALTREX) 1,000 mg tablet, TAKE 1 TABLET BY MOUTH TWICE A DAY FOR 3 DAYS, Disp: 30 tablet, Rfl: 0    Current Allergies     Allergies as of 10/16/2023 - Reviewed 10/16/2023   Allergen Reaction Noted   • Shellfish-derived products - food allergy GI Intolerance 01/22/2021            The following portions of the patient's history were reviewed and updated as appropriate: allergies, current medications, past family history, past medical history, past social history, past surgical history and problem list.     Past Medical History:   Diagnosis Date   • Anxiety    • Hypothyroidism    • Low back pain    • Lyme disease    • Migraine    • Obesity    • Polycystic ovarian syndrome    • Tachycardia    • Varicella    • Vertigo    • Viral gastroenteritis        Past Surgical History:   Procedure Laterality Date   • APPENDECTOMY     • SHOULDER SURGERY Left     with hardware   • TOOTH EXTRACTION         Family History   Problem Relation Age of Onset   • Hypertension Mother    • Thyroid disease Mother    • Hypertension Brother    • Hyperlipidemia Brother    • No Known Problems Brother    • No Known Problems Son    • No Known Problems Son    • Diabetes Maternal Grandmother    • Diabetes Paternal Grandmother    • No Known Problems Maternal Aunt    • No Known Problems Maternal Aunt    • No Known Problems Maternal Aunt    • No Known Problems Maternal Aunt    • No Known Problems Maternal Aunt    • Autism spectrum disorder Family    • Bipolar disorder Family    • Diabetes Family    • Varicose Veins Family    • Breast cancer Neg Hx    • Colon cancer Neg Hx    • Ovarian cancer Neg Hx    • Uterine cancer Neg Hx    • Cervical cancer Neg Hx          Medications have been verified. Objective     /86   Pulse 80   Temp 98.9 °F (37.2 °C) (Temporal)   Resp 20   Ht 5' 3" (1.6 m)   Wt 102 kg (225 lb)   LMP 09/18/2023 (Approximate)   SpO2 99%   BMI 39.86 kg/m²        Physical Exam     Physical Exam  Vitals and nursing note reviewed. Constitutional:       Appearance: She is well-developed. HENT:      Head: Normocephalic. Right Ear: Hearing and tympanic membrane normal.      Left Ear: Hearing and tympanic membrane normal.      Nose: No mucosal edema. Right Sinus: Maxillary sinus tenderness present. Left Sinus: Maxillary sinus tenderness present. Mouth/Throat:      Pharynx: Uvula midline. No posterior oropharyngeal erythema. Cardiovascular:      Rate and Rhythm: Normal rate and regular rhythm. Pulmonary:      Effort: Pulmonary effort is normal.      Breath sounds: Normal breath sounds.

## 2023-11-01 DIAGNOSIS — E03.9 HYPOTHYROIDISM, UNSPECIFIED TYPE: ICD-10-CM

## 2023-11-01 RX ORDER — LEVOTHYROXINE SODIUM 112 UG/1
112 TABLET ORAL
Qty: 90 TABLET | Refills: 1 | Status: SHIPPED | OUTPATIENT
Start: 2023-11-01

## 2023-11-03 DIAGNOSIS — B00.9 HSV-1 (HERPES SIMPLEX VIRUS 1) INFECTION: ICD-10-CM

## 2023-11-06 RX ORDER — VALACYCLOVIR HYDROCHLORIDE 1 G/1
TABLET, FILM COATED ORAL
Qty: 30 TABLET | Refills: 0 | Status: SHIPPED | OUTPATIENT
Start: 2023-11-06 | End: 2023-12-06

## 2023-11-30 DIAGNOSIS — E03.9 HYPOTHYROIDISM, UNSPECIFIED TYPE: ICD-10-CM

## 2023-11-30 RX ORDER — LEVOTHYROXINE SODIUM 112 UG/1
112 TABLET ORAL
Qty: 90 TABLET | Refills: 1 | Status: SHIPPED | OUTPATIENT
Start: 2023-11-30

## 2023-12-05 ENCOUNTER — RA CDI HCC (OUTPATIENT)
Dept: OTHER | Facility: HOSPITAL | Age: 43
End: 2023-12-05

## 2023-12-05 NOTE — PROGRESS NOTES
[address] [facility] coding opportunities          Chart Reviewed number of suggestions sent to Provider: 1  E66.01       Patients Insurance        Commercial Insurance: [employer]

## 2023-12-10 ENCOUNTER — OFFICE VISIT (OUTPATIENT)
Dept: URGENT CARE | Facility: CLINIC | Age: 43
End: 2023-12-10
Payer: COMMERCIAL

## 2023-12-10 VITALS
BODY MASS INDEX: 39.87 KG/M2 | HEIGHT: 63 IN | TEMPERATURE: 98.1 F | WEIGHT: 225 LBS | SYSTOLIC BLOOD PRESSURE: 134 MMHG | DIASTOLIC BLOOD PRESSURE: 73 MMHG | HEART RATE: 105 BPM | RESPIRATION RATE: 15 BRPM | OXYGEN SATURATION: 95 %

## 2023-12-10 DIAGNOSIS — J06.9 VIRAL URI WITH COUGH: Primary | ICD-10-CM

## 2023-12-10 DIAGNOSIS — H65.02 ACUTE SEROUS OTITIS MEDIA OF LEFT EAR, RECURRENCE NOT SPECIFIED: ICD-10-CM

## 2023-12-10 PROCEDURE — 99214 OFFICE O/P EST MOD 30 MIN: CPT | Performed by: PHYSICIAN ASSISTANT

## 2023-12-10 RX ORDER — DOXYCYCLINE 100 MG/1
100 TABLET ORAL 2 TIMES DAILY
Qty: 14 TABLET | Refills: 0 | Status: SHIPPED | OUTPATIENT
Start: 2023-12-10 | End: 2023-12-17

## 2023-12-10 RX ORDER — BROMPHENIRAMINE MALEATE, PSEUDOEPHEDRINE HYDROCHLORIDE, AND DEXTROMETHORPHAN HYDROBROMIDE 2; 30; 10 MG/5ML; MG/5ML; MG/5ML
10 SYRUP ORAL 3 TIMES DAILY PRN
Qty: 120 ML | Refills: 0 | Status: SHIPPED | OUTPATIENT
Start: 2023-12-10

## 2023-12-10 RX ORDER — ALBUTEROL SULFATE 90 UG/1
2 AEROSOL, METERED RESPIRATORY (INHALATION) EVERY 6 HOURS PRN
Qty: 8.5 G | Refills: 0 | Status: SHIPPED | OUTPATIENT
Start: 2023-12-10

## 2023-12-10 NOTE — PROGRESS NOTES
[facility]        NAME: Kaia Rousseau is a 37 y.o. female  : 1980    MRN: [id number]  DATE: December 10, 2023  TIME: 9:17 AM    Assessment and Plan   Acute viral sinusitis [J01.90, B97.89]  1. Acute viral sinusitis  albuterol (ProAir HFA) 90 mcg/act inhaler    brompheniramine-pseudoephedrine-DM 30-2-10 MG/5ML syrup      2. Acute serous otitis media of left ear, recurrence not specified  doxycycline (ADOXA) 100 MG tablet      Patient presents with viral URI with developing left otitis media. Patient be started on doxycycline as she was recently on Augmentin for sinus infection. She is also provided with a albuterol inhaler and Bromfed to treat URI symptoms. Patient Instructions   There are no Patient Instructions on file for this visit. Follow up with PCP in 3-5 days. Proceed to  ER if symptoms worsen. Chief Complaint     Chief Complaint   Patient presents with    Cold Like Symptoms     Pt is reporting cough/congestion/sore throat and ear pain. Negative for covid- pt reports that she was just treated a month ago for the same thing. Pt reports that she also took dbl of bromfed. History of Present Illness       15-year-old female presents with complaint of runny nose, congestion, bilateral ear pain, and cough that began on Thursday. Patient notes that she is also woke up with some chest tightness and shortness of breath this morning. Patient denies any chest pain. She denies any fever, dizziness, and lightheadedness. Negative COVID test this morning. Patient had similar symptoms a month ago and her son has been ill with similar symptoms as well. She has been taking his Bromfed and reports that has been helping somewhat. Review of Systems   Review of Systems   Constitutional:  Positive for chills and fatigue. Negative for fever. HENT:  Positive for congestion, postnasal drip, rhinorrhea and sore throat. Negative for trouble swallowing and voice change. Respiratory:  Positive for cough. Negative for shortness of breath. Cardiovascular:  Negative for chest pain. Gastrointestinal:  Negative for diarrhea, nausea and vomiting. Musculoskeletal:  Positive for myalgias. Neurological:  Negative for headaches. Current Medications       Current Outpatient Medications:     albuterol (ProAir HFA) 90 mcg/act inhaler, Inhale 2 puffs every 6 (six) hours as needed for wheezing, Disp: 8.5 g, Rfl: 0    brompheniramine-pseudoephedrine-DM 30-2-10 MG/5ML syrup, Take 10 mL by mouth 3 (three) times a day as needed for congestion or cough, Disp: 120 mL, Rfl: 0    doxycycline (ADOXA) 100 MG tablet, Take 1 tablet (100 mg total) by mouth 2 (two) times a day for 7 days, Disp: 14 tablet, Rfl: 0    ALPRAZolam (XANAX) 0.25 mg tablet, Take 1 tablet (0.25 mg total) by mouth daily as needed for anxiety, Disp: 30 tablet, Rfl: 2    levothyroxine 112 mcg tablet, TAKE 1 TABLET (112 MCG TOTAL) BY MOUTH DAILY IN THE EARLY MORNING, Disp: 90 tablet, Rfl: 1    methylPREDNISolone 4 MG tablet therapy pack, There are 21 total tablets: 6 tablets on day 1, 5 tablets on day 2, 4 tablets on day 3, 3 tablets on day 4, 2 tablets on day 5, 1 tablet on day 6.  (Patient not taking: Reported on 7/17/2023), Disp: 21 tablet, Rfl: 0    methylPREDNISolone 4 MG tablet therapy pack, Use as directed on package (Patient not taking: Reported on 12/10/2023), Disp: 21 each, Rfl: 0    predniSONE 20 mg tablet, Take 2 tablets (40 mg total) by mouth daily (Patient not taking: Reported on 8/31/2023), Disp: 10 tablet, Rfl: 0    triamcinolone (KENALOG) 0.1 % cream, Apply topically 2 (two) times a day (Patient not taking: Reported on 9/1/2022), Disp: 30 g, Rfl: 0    valACYclovir (VALTREX) 1,000 mg tablet, TAKE 1 TABLET BY MOUTH TWICE A DAY FOR 3 DAYS, Disp: 30 tablet, Rfl: 0    Current Allergies     Allergies as of 12/10/2023 - Reviewed 12/10/2023   Allergen Reaction Noted    Shellfish-derived products - food allergy GI Intolerance 01/22/2021            The following portions of the patient's history were reviewed and updated as appropriate: allergies, current medications, past family history, past medical history, past social history, past surgical history and problem list.     Past Medical History:   Diagnosis Date    Anxiety     Hypothyroidism     Low back pain     Lyme disease     Migraine     Obesity     Polycystic ovarian syndrome     Tachycardia     Varicella     Vertigo     Viral gastroenteritis        Past Surgical History:   Procedure Laterality Date    APPENDECTOMY      SHOULDER SURGERY Left     with hardware    TOOTH EXTRACTION         Family History   Problem Relation Age of Onset    Hypertension Mother     Thyroid disease Mother     Hypertension Brother     Hyperlipidemia Brother     No Known Problems Brother     No Known Problems Son     No Known Problems Son     Diabetes Maternal Grandmother     Diabetes Paternal Grandmother     No Known Problems Maternal Aunt     No Known Problems Maternal Aunt     No Known Problems Maternal Aunt     No Known Problems Maternal Aunt     No Known Problems Maternal Aunt     Autism spectrum disorder Family     Bipolar disorder Family     Diabetes Family     Varicose Veins Family     Breast cancer Neg Hx     Colon cancer Neg Hx     Ovarian cancer Neg Hx     Uterine cancer Neg Hx     Cervical cancer Neg Hx          Medications have been verified. Objective   /73   Pulse 105   Temp 98.1 °F (36.7 °C)   Resp 15   Ht 5' 3" (1.6 m)   Wt 102 kg (225 lb)   SpO2 95%   BMI 39.86 kg/m²   No LMP recorded. Physical Exam     Physical Exam  Vitals and nursing note reviewed. Constitutional:       General: She is not in acute distress. Appearance: Normal appearance. She is not ill-appearing or toxic-appearing. HENT:      Head: Normocephalic and atraumatic. Jaw: No trismus.       Right Ear: Hearing, tympanic membrane, ear canal and external ear normal. No middle ear effusion. There is no impacted cerumen. No foreign body. Tympanic membrane is not injected, scarred, perforated, erythematous, retracted or bulging. Left Ear: Hearing, ear canal and external ear normal. A middle ear effusion is present. There is no impacted cerumen. No foreign body. Tympanic membrane is injected and bulging. Tympanic membrane is not scarred, perforated, erythematous or retracted. Nose: Mucosal edema, congestion and rhinorrhea present. No nasal deformity. Rhinorrhea is clear. Right Nostril: No foreign body, epistaxis or occlusion. Left Nostril: No foreign body, epistaxis or occlusion. Right Turbinates: Not enlarged, swollen or pale. Left Turbinates: Not enlarged, swollen or pale. Right Sinus: No maxillary sinus tenderness or frontal sinus tenderness. Left Sinus: No maxillary sinus tenderness or frontal sinus tenderness. Mouth/Throat:      Lips: Pink. No lesions. Mouth: Mucous membranes are moist. No injury, oral lesions or angioedema. Dentition: Normal dentition. Tongue: No lesions. Tongue does not deviate from midline. Palate: No mass and lesions. Pharynx: Uvula midline. No pharyngeal swelling, oropharyngeal exudate, posterior oropharyngeal erythema or uvula swelling. Tonsils: No tonsillar exudate or tonsillar abscesses. Eyes:      General: Lids are normal. Gaze aligned appropriately. No allergic shiner. Extraocular Movements: Extraocular movements intact. Cardiovascular:      Rate and Rhythm: Normal rate and regular rhythm. Heart sounds: Normal heart sounds, S1 normal and S2 normal. Heart sounds not distant. No murmur heard. No friction rub. No gallop. Pulmonary:      Effort: Pulmonary effort is normal.      Breath sounds: Normal breath sounds. No decreased breath sounds, wheezing, rhonchi or rales. Comments: Patient speaking in full sentences with no increased respiratory effort.    No audible wheezing or stridor. Lymphadenopathy:      Cervical: Cervical adenopathy present. Right cervical: Superficial cervical adenopathy present. No deep or posterior cervical adenopathy. Left cervical: Superficial cervical adenopathy present. No deep or posterior cervical adenopathy. Skin:     General: Skin is warm and dry. Neurological:      Mental Status: She is alert and oriented to person, place, and time. Coordination: Coordination is intact. Gait: Gait is intact. Psychiatric:         Attention and Perception: Attention and perception normal.         Mood and Affect: Mood and affect normal.         Speech: Speech normal.         Behavior: Behavior is cooperative. Note: Portions of this record may have been created with voice recognition software. Occasional wrong word or "sound a like" substitutions may have occurred due to the inherent limitations of voice recognition software. Please read the chart carefully and recognize, using context, where substitutions have occurred. *

## 2023-12-10 NOTE — PATIENT INSTRUCTIONS
Take doxycycline as prescribed. Use albuterol inhaler as needed see attached. Bromfed as needed for cough and congestion. If symptoms or not improved in 3 to 5 days follow-up with PCP. If symptoms worsen or new symptoms develop report to the emergency room immediately.

## 2023-12-20 DIAGNOSIS — F43.22 ADJUSTMENT DISORDER WITH ANXIETY: ICD-10-CM

## 2023-12-21 RX ORDER — ALPRAZOLAM 0.25 MG/1
0.25 TABLET ORAL DAILY PRN
Qty: 30 TABLET | Refills: 0 | Status: SHIPPED | OUTPATIENT
Start: 2023-12-21

## 2024-03-05 ENCOUNTER — TELEPHONE (OUTPATIENT)
Dept: OBGYN CLINIC | Facility: MEDICAL CENTER | Age: 44
End: 2024-03-05

## 2024-03-07 ENCOUNTER — OFFICE VISIT (OUTPATIENT)
Dept: URGENT CARE | Facility: MEDICAL CENTER | Age: 44
End: 2024-03-07
Payer: COMMERCIAL

## 2024-03-07 VITALS
SYSTOLIC BLOOD PRESSURE: 156 MMHG | RESPIRATION RATE: 18 BRPM | DIASTOLIC BLOOD PRESSURE: 78 MMHG | TEMPERATURE: 98.3 F | WEIGHT: 221 LBS | HEIGHT: 63 IN | BODY MASS INDEX: 39.16 KG/M2 | OXYGEN SATURATION: 95 % | HEART RATE: 114 BPM

## 2024-03-07 DIAGNOSIS — J02.9 PHARYNGITIS, UNSPECIFIED ETIOLOGY: Primary | ICD-10-CM

## 2024-03-07 LAB — S PYO AG THROAT QL: NEGATIVE

## 2024-03-07 PROCEDURE — 87880 STREP A ASSAY W/OPTIC: CPT | Performed by: PHYSICIAN ASSISTANT

## 2024-03-07 PROCEDURE — 87070 CULTURE OTHR SPECIMN AEROBIC: CPT | Performed by: PHYSICIAN ASSISTANT

## 2024-03-07 PROCEDURE — 99213 OFFICE O/P EST LOW 20 MIN: CPT | Performed by: PHYSICIAN ASSISTANT

## 2024-03-07 RX ORDER — AMOXICILLIN 500 MG/1
500 CAPSULE ORAL EVERY 8 HOURS SCHEDULED
Qty: 30 CAPSULE | Refills: 0 | Status: SHIPPED | OUTPATIENT
Start: 2024-03-07 | End: 2024-03-17

## 2024-03-07 RX ORDER — PREDNISONE 10 MG/1
TABLET ORAL
Qty: 20 TABLET | Refills: 0 | Status: SHIPPED | OUTPATIENT
Start: 2024-03-07

## 2024-03-07 NOTE — PROGRESS NOTES
"[facility]        NAME: Tiffany Tuttle is a 44 y.o. female  : 1980    MRN: [id number]  DATE: 2024  TIME: 11:41 AM    /78   Pulse (!) 114   Temp 98.3 °F (36.8 °C) (Temporal)   Resp 18   Ht 5' 3\" (1.6 m)   Wt 100 kg (221 lb)   SpO2 95%   BMI 39.15 kg/m²     Assessment and Plan   Pharyngitis, unspecified etiology [J02.9]  1. Pharyngitis, unspecified etiology  POCT rapid ANTIGEN strepA    amoxicillin (AMOXIL) 500 mg capsule    predniSONE 10 mg tablet            Patient Instructions       Follow up with PCP in 3-5 days.  Proceed to  ER if symptoms worsen.    Chief Complaint     Chief Complaint   Patient presents with    Sore Throat     Started 3 days ago with bilateral ear pain, sore throat, headache, fatigue.  Has been taking tylenol cold and flu and Nyquil.           History of Present Illness       Pt with sore throat and ear presssure for several days     Sore Throat   Associated symptoms include ear pain.       Review of Systems   Review of Systems   Constitutional: Negative.    HENT:  Positive for ear pain and sore throat.    Eyes: Negative.    Respiratory: Negative.     Cardiovascular: Negative.    Gastrointestinal: Negative.    Endocrine: Negative.    Genitourinary: Negative.    Musculoskeletal: Negative.    Skin: Negative.    Allergic/Immunologic: Negative.    Neurological: Negative.    Hematological: Negative.    Psychiatric/Behavioral: Negative.     All other systems reviewed and are negative.        Current Medications       Current Outpatient Medications:     ALPRAZolam (XANAX) 0.25 mg tablet, TAKE 1 TABLET BY MOUTH DAILY AS NEEDED FOR ANXIETY, Disp: 30 tablet, Rfl: 0    amoxicillin (AMOXIL) 500 mg capsule, Take 1 capsule (500 mg total) by mouth every 8 (eight) hours for 10 days, Disp: 30 capsule, Rfl: 0    levothyroxine 112 mcg tablet, TAKE 1 TABLET (112 MCG TOTAL) BY MOUTH DAILY IN THE EARLY MORNING, Disp: 90 tablet, Rfl: 1    predniSONE 10 mg tablet, 4 tabs po qd x " 2 days then 3 tabs po qd x 2 days then 2 tabs po qd x 2 days then 1 tab po qd x 2 days, Disp: 20 tablet, Rfl: 0    albuterol (ProAir HFA) 90 mcg/act inhaler, Inhale 2 puffs every 6 (six) hours as needed for wheezing (Patient not taking: Reported on 3/7/2024), Disp: 8.5 g, Rfl: 0    brompheniramine-pseudoephedrine-DM 30-2-10 MG/5ML syrup, Take 10 mL by mouth 3 (three) times a day as needed for congestion or cough (Patient not taking: Reported on 3/7/2024), Disp: 120 mL, Rfl: 0    methylPREDNISolone 4 MG tablet therapy pack, There are 21 total tablets: 6 tablets on day 1, 5 tablets on day 2, 4 tablets on day 3, 3 tablets on day 4, 2 tablets on day 5, 1 tablet on day 6. (Patient not taking: Reported on 7/17/2023), Disp: 21 tablet, Rfl: 0    methylPREDNISolone 4 MG tablet therapy pack, Use as directed on package (Patient not taking: Reported on 12/10/2023), Disp: 21 each, Rfl: 0    predniSONE 20 mg tablet, Take 2 tablets (40 mg total) by mouth daily (Patient not taking: Reported on 8/31/2023), Disp: 10 tablet, Rfl: 0    triamcinolone (KENALOG) 0.1 % cream, Apply topically 2 (two) times a day (Patient not taking: Reported on 9/1/2022), Disp: 30 g, Rfl: 0    valACYclovir (VALTREX) 1,000 mg tablet, TAKE 1 TABLET BY MOUTH TWICE A DAY FOR 3 DAYS, Disp: 30 tablet, Rfl: 0    Current Allergies     Allergies as of 03/07/2024 - Reviewed 03/07/2024   Allergen Reaction Noted    Shellfish-derived products - food allergy GI Intolerance 01/22/2021            The following portions of the patient's history were reviewed and updated as appropriate: allergies, current medications, past family history, past medical history, past social history, past surgical history and problem list.     Past Medical History:   Diagnosis Date    Anxiety     Hypothyroidism     Low back pain     Lyme disease     Migraine     Obesity     Polycystic ovarian syndrome     Tachycardia     Varicella     Vertigo     Viral gastroenteritis        Past Surgical  "History:   Procedure Laterality Date    APPENDECTOMY      SHOULDER SURGERY Left     with hardware    TOOTH EXTRACTION         Family History   Problem Relation Age of Onset    Hypertension Mother     Thyroid disease Mother     Hypertension Brother     Hyperlipidemia Brother     No Known Problems Brother     No Known Problems Son     No Known Problems Son     Diabetes Maternal Grandmother     Diabetes Paternal Grandmother     No Known Problems Maternal Aunt     No Known Problems Maternal Aunt     No Known Problems Maternal Aunt     No Known Problems Maternal Aunt     No Known Problems Maternal Aunt     Autism spectrum disorder Family     Bipolar disorder Family     Diabetes Family     Varicose Veins Family     Breast cancer Neg Hx     Colon cancer Neg Hx     Ovarian cancer Neg Hx     Uterine cancer Neg Hx     Cervical cancer Neg Hx          Medications have been verified.        Objective   /78   Pulse (!) 114   Temp 98.3 °F (36.8 °C) (Temporal)   Resp 18   Ht 5' 3\" (1.6 m)   Wt 100 kg (221 lb)   SpO2 95%   BMI 39.15 kg/m²        Physical Exam     Physical Exam  Vitals and nursing note reviewed.   Constitutional:       Appearance: She is well-developed and normal weight.   HENT:      Head: Normocephalic and atraumatic.      Right Ear: Tympanic membrane and ear canal normal.      Left Ear: Tympanic membrane and ear canal normal.      Nose: No congestion or rhinorrhea.      Mouth/Throat:      Pharynx: Uvula midline. Posterior oropharyngeal erythema present. No oropharyngeal exudate.      Tonsils: No tonsillar exudate or tonsillar abscesses.   Eyes:      Conjunctiva/sclera: Conjunctivae normal.      Pupils: Pupils are equal, round, and reactive to light.   Cardiovascular:      Rate and Rhythm: Normal rate and regular rhythm.      Heart sounds: Normal heart sounds.   Pulmonary:      Effort: Pulmonary effort is normal.      Breath sounds: Normal breath sounds.   Abdominal:      General: Bowel sounds are " normal.      Palpations: Abdomen is soft.   Musculoskeletal:      Cervical back: Normal range of motion and neck supple.   Lymphadenopathy:      Cervical: Cervical adenopathy present.   Skin:     General: Skin is warm.   Neurological:      Mental Status: She is alert.

## 2024-03-09 LAB — BACTERIA THROAT CULT: NORMAL

## 2024-03-13 DIAGNOSIS — F43.22 ADJUSTMENT DISORDER WITH ANXIETY: ICD-10-CM

## 2024-03-13 RX ORDER — ALPRAZOLAM 0.25 MG/1
0.25 TABLET ORAL DAILY PRN
Qty: 30 TABLET | Refills: 0 | Status: SHIPPED | OUTPATIENT
Start: 2024-03-13

## 2024-03-25 ENCOUNTER — APPOINTMENT (OUTPATIENT)
Dept: LAB | Facility: CLINIC | Age: 44
End: 2024-03-25
Payer: COMMERCIAL

## 2024-03-25 DIAGNOSIS — E03.9 HYPOTHYROIDISM, UNSPECIFIED TYPE: ICD-10-CM

## 2024-03-25 DIAGNOSIS — E78.2 MIXED HYPERLIPIDEMIA: ICD-10-CM

## 2024-03-25 DIAGNOSIS — R73.9 ELEVATED BLOOD SUGAR: ICD-10-CM

## 2024-03-25 LAB
ALBUMIN SERPL BCP-MCNC: 4.3 G/DL (ref 3.5–5)
ALP SERPL-CCNC: 68 U/L (ref 34–104)
ALT SERPL W P-5'-P-CCNC: 13 U/L (ref 7–52)
ANION GAP SERPL CALCULATED.3IONS-SCNC: 7 MMOL/L (ref 4–13)
AST SERPL W P-5'-P-CCNC: 11 U/L (ref 13–39)
BILIRUB SERPL-MCNC: 0.66 MG/DL (ref 0.2–1)
BUN SERPL-MCNC: 11 MG/DL (ref 5–25)
CALCIUM SERPL-MCNC: 9.3 MG/DL (ref 8.4–10.2)
CHLORIDE SERPL-SCNC: 104 MMOL/L (ref 96–108)
CHOLEST SERPL-MCNC: 240 MG/DL
CO2 SERPL-SCNC: 26 MMOL/L (ref 21–32)
CREAT SERPL-MCNC: 0.68 MG/DL (ref 0.6–1.3)
EST. AVERAGE GLUCOSE BLD GHB EST-MCNC: 111 MG/DL
GFR SERPL CREATININE-BSD FRML MDRD: 105 ML/MIN/1.73SQ M
GLUCOSE P FAST SERPL-MCNC: 96 MG/DL (ref 65–99)
HBA1C MFR BLD: 5.5 %
HDLC SERPL-MCNC: 54 MG/DL
LDLC SERPL CALC-MCNC: 152 MG/DL (ref 0–100)
NONHDLC SERPL-MCNC: 186 MG/DL
POTASSIUM SERPL-SCNC: 4.3 MMOL/L (ref 3.5–5.3)
PROT SERPL-MCNC: 7.3 G/DL (ref 6.4–8.4)
SODIUM SERPL-SCNC: 137 MMOL/L (ref 135–147)
TRIGL SERPL-MCNC: 172 MG/DL
TSH SERPL DL<=0.05 MIU/L-ACNC: 3.86 UIU/ML (ref 0.45–4.5)

## 2024-03-25 PROCEDURE — 80061 LIPID PANEL: CPT

## 2024-03-25 PROCEDURE — 80053 COMPREHEN METABOLIC PANEL: CPT

## 2024-03-25 PROCEDURE — 83036 HEMOGLOBIN GLYCOSYLATED A1C: CPT

## 2024-03-25 PROCEDURE — 84443 ASSAY THYROID STIM HORMONE: CPT

## 2024-03-25 PROCEDURE — 36415 COLL VENOUS BLD VENIPUNCTURE: CPT

## 2024-03-28 ENCOUNTER — OFFICE VISIT (OUTPATIENT)
Dept: URGENT CARE | Facility: CLINIC | Age: 44
End: 2024-03-28
Payer: COMMERCIAL

## 2024-03-28 DIAGNOSIS — H10.33 ACUTE CONJUNCTIVITIS OF BOTH EYES, UNSPECIFIED ACUTE CONJUNCTIVITIS TYPE: Primary | ICD-10-CM

## 2024-03-28 PROCEDURE — 99213 OFFICE O/P EST LOW 20 MIN: CPT | Performed by: NURSE PRACTITIONER

## 2024-03-28 RX ORDER — POLYMYXIN B SULFATE AND TRIMETHOPRIM 1; 10000 MG/ML; [USP'U]/ML
1 SOLUTION OPHTHALMIC EVERY 4 HOURS
Qty: 10 ML | Refills: 0 | Status: SHIPPED | OUTPATIENT
Start: 2024-03-28

## 2024-03-28 NOTE — PROGRESS NOTES
[facility]        NAME: Tiffany Tuttle is a 44 y.o. female  : 1980    MRN: [id number]  DATE: 2024  TIME: 11:30 AM    Assessment and Plan   Acute conjunctivitis of both eyes, unspecified acute conjunctivitis type [H10.33]  1. Acute conjunctivitis of both eyes, unspecified acute conjunctivitis type  polymyxin b-trimethoprim (POLYTRIM) ophthalmic solution            Patient Instructions     --Instill antibiotic eye drops as prescribed x 5-7 days  --Warm compresses to eyes three times a day  --Follow-up with eye doctor if no improvement/worsening over the next 3-5 days.     If tests have been performed at [facility], our office will contact you with results if changes need to be made to the care plan discussed with you at the visit.  You can review your full results on [facility].    Chief Complaint     Chief Complaint   Patient presents with    Conjunctivitis     Pt reports that her son just finished his treatment for pink eye and this am she woke up with yellow crusted discharge on her right eye.         History of Present Illness       Here with complaints of R>L eye redness, tenderness, irritation, tearing, crusting since this morning.    Some nasal congestion, rhinorrhea x couple days.  Initial sore throat.  No fever, cough.    No itching, photophobia.     Son with recent pink eye.  Is currently being treated with eye drops.    No contact lens use.          Review of Systems   Review of Systems   Constitutional:  Negative for fever.   HENT:  Positive for rhinorrhea and sore throat.    Eyes:  Positive for discharge and redness. Negative for photophobia, itching and visual disturbance.   Respiratory:  Negative for cough.          Current Medications       Current Outpatient Medications:     polymyxin b-trimethoprim (POLYTRIM) ophthalmic solution, Administer 1 drop to both eyes every 4 (four) hours, Disp: 10 mL, Rfl: 0    albuterol (ProAir HFA) 90 mcg/act inhaler, Inhale 2 puffs  every 6 (six) hours as needed for wheezing (Patient not taking: Reported on 3/7/2024), Disp: 8.5 g, Rfl: 0    ALPRAZolam (XANAX) 0.25 mg tablet, TAKE 1 TABLET BY MOUTH EVERY DAY AS NEEDED FOR ANXIETY, Disp: 30 tablet, Rfl: 0    brompheniramine-pseudoephedrine-DM 30-2-10 MG/5ML syrup, Take 10 mL by mouth 3 (three) times a day as needed for congestion or cough (Patient not taking: Reported on 3/7/2024), Disp: 120 mL, Rfl: 0    levothyroxine 112 mcg tablet, TAKE 1 TABLET (112 MCG TOTAL) BY MOUTH DAILY IN THE EARLY MORNING, Disp: 90 tablet, Rfl: 1    methylPREDNISolone 4 MG tablet therapy pack, There are 21 total tablets: 6 tablets on day 1, 5 tablets on day 2, 4 tablets on day 3, 3 tablets on day 4, 2 tablets on day 5, 1 tablet on day 6. (Patient not taking: Reported on 7/17/2023), Disp: 21 tablet, Rfl: 0    methylPREDNISolone 4 MG tablet therapy pack, Use as directed on package (Patient not taking: Reported on 12/10/2023), Disp: 21 each, Rfl: 0    predniSONE 10 mg tablet, 4 tabs po qd x 2 days then 3 tabs po qd x 2 days then 2 tabs po qd x 2 days then 1 tab po qd x 2 days, Disp: 20 tablet, Rfl: 0    predniSONE 20 mg tablet, Take 2 tablets (40 mg total) by mouth daily (Patient not taking: Reported on 8/31/2023), Disp: 10 tablet, Rfl: 0    triamcinolone (KENALOG) 0.1 % cream, Apply topically 2 (two) times a day (Patient not taking: Reported on 9/1/2022), Disp: 30 g, Rfl: 0    valACYclovir (VALTREX) 1,000 mg tablet, TAKE 1 TABLET BY MOUTH TWICE A DAY FOR 3 DAYS, Disp: 30 tablet, Rfl: 0    Current Allergies     Allergies as of 03/28/2024 - Reviewed 03/28/2024   Allergen Reaction Noted    Shellfish-derived products - food allergy GI Intolerance 01/22/2021            The following portions of the patient's history were reviewed and updated as appropriate: allergies, current medications, past family history, past medical history, past social history, past surgical history and problem list.     Past Medical History:    Diagnosis Date    Anxiety     Hypothyroidism     Low back pain     Lyme disease     Migraine     Obesity     Polycystic ovarian syndrome     Tachycardia     Varicella     Vertigo     Viral gastroenteritis        Past Surgical History:   Procedure Laterality Date    APPENDECTOMY      SHOULDER SURGERY Left     with hardware    TOOTH EXTRACTION         Family History   Problem Relation Age of Onset    Hypertension Mother     Thyroid disease Mother     Hypertension Brother     Hyperlipidemia Brother     No Known Problems Brother     No Known Problems Son     No Known Problems Son     Diabetes Maternal Grandmother     Diabetes Paternal Grandmother     No Known Problems Maternal Aunt     No Known Problems Maternal Aunt     No Known Problems Maternal Aunt     No Known Problems Maternal Aunt     No Known Problems Maternal Aunt     Autism spectrum disorder Family     Bipolar disorder Family     Diabetes Family     Varicose Veins Family     Breast cancer Neg Hx     Colon cancer Neg Hx     Ovarian cancer Neg Hx     Uterine cancer Neg Hx     Cervical cancer Neg Hx          Medications have been verified.        Objective   There were no vitals taken for this visit.  No LMP recorded.       Physical Exam     Physical Exam  HENT:      Nose: Congestion present. No rhinorrhea.   Eyes:      Extraocular Movements: Extraocular movements intact.      Pupils: Pupils are equal, round, and reactive to light.      Comments: Both eyes with mild conjunctival and peripheral scleral injection with scant tearing, crusting.  No ciliary flush.    Lids with normal appearance.        Neurological:      Mental Status: She is alert.

## 2024-03-28 NOTE — PATIENT INSTRUCTIONS
--Instill antibiotic eye drops as prescribed x 5-7 days  --Warm compresses to eyes three times a day  --Follow-up with eye doctor if no improvement/worsening over the next 3-5 days.

## 2024-04-01 ENCOUNTER — HOSPITAL ENCOUNTER (OUTPATIENT)
Dept: MAMMOGRAPHY | Facility: CLINIC | Age: 44
Discharge: HOME | End: 2024-04-01
Payer: COMMERCIAL

## 2024-04-01 ENCOUNTER — HOSPITAL ENCOUNTER (OUTPATIENT)
Dept: ULTRASOUND IMAGING | Facility: CLINIC | Age: 44
Discharge: HOME | End: 2024-04-01
Payer: COMMERCIAL

## 2024-04-01 VITALS — BODY MASS INDEX: 39.16 KG/M2 | WEIGHT: 221 LBS | HEIGHT: 63 IN

## 2024-04-01 DIAGNOSIS — R92.8 ABNORMAL MAMMOGRAM: ICD-10-CM

## 2024-04-01 DIAGNOSIS — N64.89 BREAST ASYMMETRY IN FEMALE: ICD-10-CM

## 2024-04-01 DIAGNOSIS — R92.8 ABNORMAL MAMMOGRAM OF RIGHT BREAST: Primary | ICD-10-CM

## 2024-04-01 PROCEDURE — 76642 ULTRASOUND BREAST LIMITED: CPT

## 2024-04-01 PROCEDURE — 77066 DX MAMMO INCL CAD BI: CPT

## 2024-04-01 PROCEDURE — G0279 TOMOSYNTHESIS, MAMMO: HCPCS

## 2024-04-02 ENCOUNTER — TELEPHONE (OUTPATIENT)
Age: 44
End: 2024-04-02

## 2024-04-02 NOTE — RESULT ENCOUNTER NOTE
Diagnostic mammogram and ultrasound shows an asymmetry and cyst in the right breast that are basically unchanged from last evaluation.    Follow up with diagnostic mammogram and right breast ultrasound recommended in one year. Orders placed in chart.   Please schedule annual exam from 9/24.

## 2024-04-02 NOTE — TELEPHONE ENCOUNTER
----- Message from Day Neumann sent at 4/2/2024  8:29 AM EDT -----    ----- Message -----  From: JOY Narayanan  Sent: 4/1/2024   8:25 PM EDT  To: [facility]    Diagnostic mammogram and ultrasound shows an asymmetry and cyst in the right breast that are basically unchanged from last evaluation.    Follow up with diagnostic mammogram and right breast ultrasound recommended in one year. Orders placed in chart.   Please schedule annual exam from 9/24.

## 2024-04-03 ENCOUNTER — OFFICE VISIT (OUTPATIENT)
Dept: URGENT CARE | Facility: CLINIC | Age: 44
End: 2024-04-03
Payer: COMMERCIAL

## 2024-04-03 VITALS
WEIGHT: 221 LBS | BODY MASS INDEX: 39.16 KG/M2 | TEMPERATURE: 97.7 F | HEIGHT: 63 IN | RESPIRATION RATE: 15 BRPM | SYSTOLIC BLOOD PRESSURE: 153 MMHG | OXYGEN SATURATION: 97 % | DIASTOLIC BLOOD PRESSURE: 97 MMHG | HEART RATE: 94 BPM

## 2024-04-03 DIAGNOSIS — J06.9 VIRAL URI WITH COUGH: ICD-10-CM

## 2024-04-03 DIAGNOSIS — J01.00 ACUTE NON-RECURRENT MAXILLARY SINUSITIS: Primary | ICD-10-CM

## 2024-04-03 PROCEDURE — 99213 OFFICE O/P EST LOW 20 MIN: CPT | Performed by: NURSE PRACTITIONER

## 2024-04-03 RX ORDER — DOXYCYCLINE 100 MG/1
100 TABLET ORAL 2 TIMES DAILY
Qty: 14 TABLET | Refills: 0 | Status: SHIPPED | OUTPATIENT
Start: 2024-04-03 | End: 2024-04-10

## 2024-04-03 RX ORDER — BROMPHENIRAMINE MALEATE, PSEUDOEPHEDRINE HYDROCHLORIDE, AND DEXTROMETHORPHAN HYDROBROMIDE 2; 30; 10 MG/5ML; MG/5ML; MG/5ML
10 SYRUP ORAL 3 TIMES DAILY PRN
Qty: 120 ML | Refills: 0 | Status: SHIPPED | OUTPATIENT
Start: 2024-04-03

## 2024-04-03 NOTE — PROGRESS NOTES
[facility]        NAME: Tiffany Tuttle is a 44 y.o. female  : 1980    MRN: [id number]  DATE: April 3, 2024  TIME: 11:48 AM    Assessment and Plan   Acute non-recurrent maxillary sinusitis [J01.00]  1. Acute non-recurrent maxillary sinusitis  doxycycline (ADOXA) 100 MG tablet            Patient Instructions   Doxycycline twice a day for 7 days  Flonase 1 spray each nostril daily.  Saline nasal spray as directed to rinse sinus passages.  Increase your fluid intake.  Tylenol and/or Motrin as needed for pain or fever.  Follow up with your PCP for worsening or concerning symptoms     Follow up with PCP in 3-5 days.  Proceed to  ER if symptoms worsen.    Chief Complaint     Chief Complaint   Patient presents with    Cold Like Symptoms     Pt reports that she is having ear pain and sore throat and severe sinus congestion. She has been taking OTC meds and has not had any relief from current treatment she did contact her PCP regarding sx and refill of bromfed.          History of Present Illness       Patient is a 44-year-old female presenting with approximately 1 month of sinus congestion, green nasal drainage, and sore throat.  She also has intermittent ear pain.  She reports intermittent hoarse voice, and anterior lateral neck pain.  Denies fever, chills, abdominal pain.  She has been taking over-the-counter medication including Flonase, Zyrtec, Robitussin, and prescription Bromfed she reports temporary relief with medications but symptoms returned.  She was treated approximately 1 month ago with amoxicillin to treat pharyngitis.  At the time her strep was negative.  She reports symptoms improved but never completely resolved.    Sore Throat   The current episode started in the past 7 days. The problem has been unchanged. The pain is worse on the left side. There has been no fever. The pain is at a severity of 8/10. Associated symptoms include congestion, ear pain, a hoarse voice, neck pain and trouble  swallowing. Pertinent negatives include no abdominal pain, coughing, diarrhea, drooling, ear discharge, headaches, plugged ear sensation, shortness of breath, stridor, swollen glands or vomiting. She has had no exposure to strep or mono.       Review of Systems   Review of Systems   Constitutional:  Positive for fatigue. Negative for activity change, chills and fever.   HENT:  Positive for congestion, ear pain, hoarse voice, rhinorrhea, sore throat and trouble swallowing. Negative for drooling, ear discharge, sinus pressure and sinus pain.    Respiratory:  Negative for cough, shortness of breath and stridor.    Gastrointestinal:  Negative for abdominal pain, diarrhea and vomiting.   Musculoskeletal:  Positive for neck pain.   Neurological:  Negative for headaches.         Current Medications       Current Outpatient Medications:     doxycycline (ADOXA) 100 MG tablet, Take 1 tablet (100 mg total) by mouth 2 (two) times a day for 7 days, Disp: 14 tablet, Rfl: 0    albuterol (ProAir HFA) 90 mcg/act inhaler, Inhale 2 puffs every 6 (six) hours as needed for wheezing (Patient not taking: Reported on 3/7/2024), Disp: 8.5 g, Rfl: 0    ALPRAZolam (XANAX) 0.25 mg tablet, TAKE 1 TABLET BY MOUTH EVERY DAY AS NEEDED FOR ANXIETY, Disp: 30 tablet, Rfl: 0    brompheniramine-pseudoephedrine-DM 30-2-10 MG/5ML syrup, Take 10 mL by mouth 3 (three) times a day as needed for congestion or cough, Disp: 120 mL, Rfl: 0    levothyroxine 112 mcg tablet, TAKE 1 TABLET (112 MCG TOTAL) BY MOUTH DAILY IN THE EARLY MORNING, Disp: 90 tablet, Rfl: 1    polymyxin b-trimethoprim (POLYTRIM) ophthalmic solution, Administer 1 drop to both eyes every 4 (four) hours, Disp: 10 mL, Rfl: 0    triamcinolone (KENALOG) 0.1 % cream, Apply topically 2 (two) times a day (Patient not taking: Reported on 9/1/2022), Disp: 30 g, Rfl: 0    valACYclovir (VALTREX) 1,000 mg tablet, TAKE 1 TABLET BY MOUTH TWICE A DAY FOR 3 DAYS, Disp: 30 tablet, Rfl: 0    Current Allergies  "    Allergies as of 04/03/2024 - Reviewed 04/01/2024   Allergen Reaction Noted    Shellfish-derived products - food allergy GI Intolerance 01/22/2021            The following portions of the patient's history were reviewed and updated as appropriate: allergies, current medications, past family history, past medical history, past social history, past surgical history and problem list.     Past Medical History:   Diagnosis Date    Anxiety     Hypothyroidism     Low back pain     Lyme disease     Migraine     Obesity     Polycystic ovarian syndrome     Tachycardia     Varicella     Vertigo     Viral gastroenteritis        Past Surgical History:   Procedure Laterality Date    APPENDECTOMY      SHOULDER SURGERY Left     with hardware    TOOTH EXTRACTION         Family History   Problem Relation Age of Onset    Hypertension Mother     Thyroid disease Mother     Hypertension Brother     Hyperlipidemia Brother     No Known Problems Brother     No Known Problems Son     No Known Problems Son     Diabetes Maternal Grandmother     Diabetes Paternal Grandmother     No Known Problems Maternal Aunt     No Known Problems Maternal Aunt     No Known Problems Maternal Aunt     No Known Problems Maternal Aunt     No Known Problems Maternal Aunt     Autism spectrum disorder Family     Bipolar disorder Family     Diabetes Family     Varicose Veins Family     Breast cancer Neg Hx     Colon cancer Neg Hx     Ovarian cancer Neg Hx     Uterine cancer Neg Hx     Cervical cancer Neg Hx          Medications have been verified.        Objective   /97   Pulse 94   Temp 97.7 °F (36.5 °C)   Resp 15   Ht 5' 3\" (1.6 m)   Wt 100 kg (221 lb)   LMP 03/04/2024 (Approximate)   SpO2 97%   BMI 39.15 kg/m²        Physical Exam     Physical Exam  Vitals reviewed.   Constitutional:       General: She is awake. She is not in acute distress.     Appearance: Normal appearance. She is normal weight.   HENT:      Head: Normocephalic.      Right Ear: " Hearing, tympanic membrane, ear canal and external ear normal.      Left Ear: Hearing, tympanic membrane, ear canal and external ear normal.      Nose: Rhinorrhea present.      Mouth/Throat:      Lips: Pink.      Pharynx: Oropharynx is clear.   Cardiovascular:      Rate and Rhythm: Normal rate and regular rhythm.      Heart sounds: Normal heart sounds, S1 normal and S2 normal.   Pulmonary:      Effort: Pulmonary effort is normal.      Breath sounds: Normal breath sounds. No decreased breath sounds, wheezing or rhonchi.   Lymphadenopathy:      Head:      Right side of head: No submandibular or tonsillar adenopathy.      Left side of head: No submandibular or tonsillar adenopathy.   Skin:     General: Skin is warm and moist.   Neurological:      General: No focal deficit present.      Mental Status: She is alert and oriented to person, place, and time.   Psychiatric:         Behavior: Behavior is cooperative.

## 2024-04-03 NOTE — TELEPHONE ENCOUNTER
----- Message from Tiffany Tuttle sent at 4/3/2024  5:33 AM EDT -----  Regarding: Sore Throat  Contact: [phone]  I have a care now appointment for 10:30 today because I recently had pink eye which is getting better however now I have a sore throat, ear ache, and congestion I believe from that.  Is it possible to get the below refilled?    brompheniramine-pseudoephedrine-DM 30-2-10 MG/5ML syrup  Learn more  Take 10 mL by mouth 3 (three) times a day as needed for congestion or cough    Thanks.  Carrie

## 2024-04-04 ENCOUNTER — OFFICE VISIT (OUTPATIENT)
Dept: FAMILY MEDICINE CLINIC | Facility: CLINIC | Age: 44
End: 2024-04-04
Payer: COMMERCIAL

## 2024-04-04 VITALS
OXYGEN SATURATION: 98 % | SYSTOLIC BLOOD PRESSURE: 125 MMHG | DIASTOLIC BLOOD PRESSURE: 80 MMHG | WEIGHT: 223 LBS | HEIGHT: 63 IN | BODY MASS INDEX: 39.51 KG/M2 | HEART RATE: 99 BPM

## 2024-04-04 DIAGNOSIS — R11.0 NAUSEA: ICD-10-CM

## 2024-04-04 DIAGNOSIS — E03.9 HYPOTHYROIDISM, UNSPECIFIED TYPE: Primary | ICD-10-CM

## 2024-04-04 DIAGNOSIS — F43.22 ADJUSTMENT DISORDER WITH ANXIETY: ICD-10-CM

## 2024-04-04 DIAGNOSIS — E78.2 MIXED HYPERLIPIDEMIA: ICD-10-CM

## 2024-04-04 DIAGNOSIS — Z00.00 PREVENTATIVE HEALTH CARE: ICD-10-CM

## 2024-04-04 DIAGNOSIS — Z23 ENCOUNTER FOR IMMUNIZATION: ICD-10-CM

## 2024-04-04 DIAGNOSIS — Z00.00 ANNUAL PHYSICAL EXAM: ICD-10-CM

## 2024-04-04 PROCEDURE — 90715 TDAP VACCINE 7 YRS/> IM: CPT | Performed by: FAMILY MEDICINE

## 2024-04-04 PROCEDURE — 99396 PREV VISIT EST AGE 40-64: CPT | Performed by: FAMILY MEDICINE

## 2024-04-04 PROCEDURE — 90471 IMMUNIZATION ADMIN: CPT | Performed by: FAMILY MEDICINE

## 2024-04-04 PROCEDURE — 99215 OFFICE O/P EST HI 40 MIN: CPT | Performed by: FAMILY MEDICINE

## 2024-04-04 RX ORDER — ONDANSETRON 4 MG/1
4 TABLET, ORALLY DISINTEGRATING ORAL EVERY 8 HOURS PRN
Qty: 30 TABLET | Refills: 1 | Status: SHIPPED | OUTPATIENT
Start: 2024-04-04

## 2024-04-04 RX ORDER — LEVOTHYROXINE SODIUM 112 UG/1
112 TABLET ORAL
Qty: 90 TABLET | Refills: 1 | Status: SHIPPED | OUTPATIENT
Start: 2024-04-04

## 2024-04-04 RX ORDER — ALPRAZOLAM 0.25 MG/1
0.25 TABLET ORAL DAILY PRN
Qty: 30 TABLET | Refills: 2 | Status: SHIPPED | OUTPATIENT
Start: 2024-04-04

## 2024-04-04 NOTE — PROGRESS NOTES
ADULT ANNUAL PHYSICAL  [facility]    NAME: Tiffany Tuttle  AGE: 44 y.o. SEX: female  : 1980     DATE: 2024     Assessment and Plan:     Problem List Items Addressed This Visit          Endocrine    Hypothyroidism - Primary     TSH stable.  Continue levothyroxine 112 mcg.         Relevant Medications    levothyroxine 112 mcg tablet    Other Relevant Orders    TSH, 3rd generation with Free T4 reflex       Behavioral Health    Adjustment disorder with anxiety     Symptoms responding well to intermittent use of Xanax 0.25 milligram.  Continue same.  PDMP checked.  Medication refill called in.         Relevant Medications    Semaglutide-Weight Management (WEGOVY) 0.25 MG/0.5ML    ALPRAZolam (XANAX) 0.25 mg tablet       Other    Mixed hyperlipidemia     Noted to have elevated cholesterol .  Currently not on any medications.   different treatment options discussed with patient, she does not want to start statin yet however is requesting weight loss medication to see if that helps improve her metabolic labs.         Relevant Orders    Comprehensive metabolic panel    Lipid panel    BMI 39.0-39.9,adult     Patient requesting to be started on weight loss medication.  Requesting to be started on Wegovy.  Denies any personal or family history of thyroid cancer or M EN neoplasia.  Medication effects and side effects discussed with patient today.  Recommended to start with a low-carb healthy diet.  Also advised routine exercise 150 minutes/week, increase intake of water at least 64 ounces per day.  Recommended to repeat CMP at 4 to 6 weeks and then follow-up thereafter for weight check.         Relevant Medications    Semaglutide-Weight Management (WEGOVY) 0.25 MG/0.5ML    Other Relevant Orders    Comprehensive metabolic panel    Preventative health care     Patient is up-to-date on breast, cervical and colorectal cancer screening.  Received Adacel  today.         Nausea    Relevant Medications    ondansetron (ZOFRAN-ODT) 4 mg disintegrating tablet     Other Visit Diagnoses       Encounter for immunization        Relevant Orders    TDAP VACCINE GREATER THAN OR EQUAL TO 8YO IM (Completed)    Annual physical exam                Immunizations and preventive care screenings were discussed with patient today. Appropriate education was printed on patient's after visit summary.    Counseling:  Dental Health: discussed importance of regular tooth brushing, flossing, and dental visits.  Exercise: the importance of regular exercise/physical activity was discussed. Recommend exercise 3-5 times per week for at least 30 minutes.          No follow-ups on file.     Chief Complaint:     Chief Complaint   Patient presents with    Physical Exam      History of Present Illness:     Adult Annual Physical   Patient here for a comprehensive physical exam. The patient reports problems - concerned about her weight .  Patient is also following up on her chronic medical problems.  Has history of hyperlipidemia, hypothyroidism, anxiety.  Metabolic labs reviewed with patient, TSH stable however noted to have elevated cholesterol.  Diet and Physical Activity  Diet/Nutrition: well balanced diet.   Exercise: walking.      Depression Screening  PHQ-2/9 Depression Screening    Little interest or pleasure in doing things: 0 - not at all  Feeling down, depressed, or hopeless: 0 - not at all  PHQ-2 Score: 0  PHQ-2 Interpretation: Negative depression screen       General Health  Sleep: sleeps well.   Hearing: normal - bilateral.  Vision: no vision problems.   Dental: regular dental visits.       /GYN Health  Last Pap was in 2022     Review of Systems:     Review of Systems   Constitutional: Negative.    Respiratory: Negative.     Cardiovascular: Negative.       Past Medical History:     Past Medical History:   Diagnosis Date    Anxiety     Hypothyroidism     Low back pain     Lyme disease      Migraine     Obesity     Polycystic ovarian syndrome     Tachycardia     Varicella     Vertigo     Viral gastroenteritis       Past Surgical History:     Past Surgical History:   Procedure Laterality Date    APPENDECTOMY      SHOULDER SURGERY Left     with hardware    TOOTH EXTRACTION        Social History:     Social History     Socioeconomic History    Marital status: /Civil Union     Spouse name: None    Number of children: None    Years of education: None    Highest education level: None   Occupational History    None   Tobacco Use    Smoking status: Never    Smokeless tobacco: Never   Vaping Use    Vaping status: Never Used   Substance and Sexual Activity    Alcohol use: Yes     Alcohol/week: 3.0 standard drinks of alcohol     Types: 3 Standard drinks or equivalent per week     Comment: socially    Drug use: Never    Sexual activity: Yes     Partners: Male     Birth control/protection: Surgical     Comment: ESURE   Other Topics Concern    None   Social History Narrative    Caffeine use     Uses seat belt     Social Determinants of Health     Financial Resource Strain: Not on file   Food Insecurity: Not on file   Transportation Needs: Not on file   Physical Activity: Not on file   Stress: Not on file   Social Connections: Not on file   Intimate Partner Violence: Not on file   Housing Stability: Not on file      Family History:     Family History   Problem Relation Age of Onset    Hypertension Mother     Thyroid disease Mother     Hypertension Brother     Hyperlipidemia Brother     No Known Problems Brother     No Known Problems Son     No Known Problems Son     Diabetes Maternal Grandmother     Diabetes Paternal Grandmother     No Known Problems Maternal Aunt     No Known Problems Maternal Aunt     No Known Problems Maternal Aunt     No Known Problems Maternal Aunt     No Known Problems Maternal Aunt     Autism spectrum disorder Family     Bipolar disorder Family     Diabetes Family     Varicose Veins  "Family     Breast cancer Neg Hx     Colon cancer Neg Hx     Ovarian cancer Neg Hx     Uterine cancer Neg Hx     Cervical cancer Neg Hx       Current Medications:     Current Outpatient Medications   Medication Sig Dispense Refill    ALPRAZolam (XANAX) 0.25 mg tablet Take 1 tablet (0.25 mg total) by mouth daily as needed for anxiety 30 tablet 2    brompheniramine-pseudoephedrine-DM 30-2-10 MG/5ML syrup Take 10 mL by mouth 3 (three) times a day as needed for congestion or cough 120 mL 0    doxycycline (ADOXA) 100 MG tablet Take 1 tablet (100 mg total) by mouth 2 (two) times a day for 7 days 14 tablet 0    levothyroxine 112 mcg tablet Take 1 tablet (112 mcg total) by mouth daily in the early morning 90 tablet 1    ondansetron (ZOFRAN-ODT) 4 mg disintegrating tablet Take 1 tablet (4 mg total) by mouth every 8 (eight) hours as needed for nausea or vomiting 30 tablet 1    polymyxin b-trimethoprim (POLYTRIM) ophthalmic solution Administer 1 drop to both eyes every 4 (four) hours 10 mL 0    Semaglutide-Weight Management (WEGOVY) 0.25 MG/0.5ML Inject 0.5 mL (0.25 mg total) under the skin once a week 2 mL 1    albuterol (ProAir HFA) 90 mcg/act inhaler Inhale 2 puffs every 6 (six) hours as needed for wheezing (Patient not taking: Reported on 3/7/2024) 8.5 g 0    triamcinolone (KENALOG) 0.1 % cream Apply topically 2 (two) times a day (Patient not taking: Reported on 9/1/2022) 30 g 0    valACYclovir (VALTREX) 1,000 mg tablet TAKE 1 TABLET BY MOUTH TWICE A DAY FOR 3 DAYS 30 tablet 0     No current facility-administered medications for this visit.      Allergies:     Allergies   Allergen Reactions    Shellfish-Derived Products - Food Allergy GI Intolerance      Physical Exam:     /80   Pulse 99   Ht 5' 3\" (1.6 m)   Wt 101 kg (223 lb)   LMP 03/04/2024 (Approximate)   SpO2 98%   BMI 39.50 kg/m²     Physical Exam  Constitutional:       Appearance: Normal appearance.   HENT:      Right Ear: Tympanic membrane normal.      " Left Ear: Tympanic membrane normal.      Mouth/Throat:      Pharynx: No posterior oropharyngeal erythema.   Eyes:      Pupils: Pupils are equal, round, and reactive to light.   Cardiovascular:      Heart sounds: Normal heart sounds.   Pulmonary:      Breath sounds: Normal breath sounds.   Abdominal:      Palpations: Abdomen is soft.   Musculoskeletal:      Right lower leg: No edema.      Left lower leg: No edema.   Lymphadenopathy:      Cervical: No cervical adenopathy.   Neurological:      Mental Status: She is oriented to person, place, and time.   Psychiatric:         Mood and Affect: Mood normal.          Radha Lino MD  [facility]

## 2024-04-04 NOTE — ASSESSMENT & PLAN NOTE
Symptoms responding well to intermittent use of Xanax 0.25 milligram.  Continue same.  PDMP checked.  Medication refill called in.

## 2024-04-04 NOTE — ASSESSMENT & PLAN NOTE
Noted to have elevated cholesterol .  Currently not on any medications.   different treatment options discussed with patient, she does not want to start statin yet however is requesting weight loss medication to see if that helps improve her metabolic labs.

## 2024-04-04 NOTE — ASSESSMENT & PLAN NOTE
Patient requesting to be started on weight loss medication.  Requesting to be started on Wegovy.  Denies any personal or family history of thyroid cancer or M EN neoplasia.  Medication effects and side effects discussed with patient today.  Recommended to start with a low-carb healthy diet.  Also advised routine exercise 150 minutes/week, increase intake of water at least 64 ounces per day.  Recommended to repeat CMP at 4 to 6 weeks and then follow-up thereafter for weight check.

## 2024-04-04 NOTE — ASSESSMENT & PLAN NOTE
Patient is up-to-date on breast, cervical and colorectal cancer screening.  Received Adacel today.

## 2024-04-05 ENCOUNTER — TELEPHONE (OUTPATIENT)
Age: 44
End: 2024-04-05

## 2024-04-09 NOTE — TELEPHONE ENCOUNTER
Duplicate PA encounter please see other telephone encounter from 4-5-24 regarding Wegovy PA. Please review patient's chart to see status of PA before creating another encounter Thank You.

## 2024-04-19 ENCOUNTER — HOSPITAL ENCOUNTER (EMERGENCY)
Facility: HOSPITAL | Age: 44
Discharge: HOME | End: 2024-04-19
Attending: EMERGENCY MEDICINE
Payer: COMMERCIAL

## 2024-04-19 ENCOUNTER — APPOINTMENT (EMERGENCY)
Dept: CT IMAGING | Facility: HOSPITAL | Age: 44
End: 2024-04-19
Payer: COMMERCIAL

## 2024-04-19 VITALS
RESPIRATION RATE: 18 BRPM | SYSTOLIC BLOOD PRESSURE: 144 MMHG | HEART RATE: 79 BPM | DIASTOLIC BLOOD PRESSURE: 79 MMHG | OXYGEN SATURATION: 99 % | TEMPERATURE: 97.6 F

## 2024-04-19 DIAGNOSIS — R07.89 CHEST WALL PAIN: Primary | ICD-10-CM

## 2024-04-19 LAB
ALBUMIN SERPL BCP-MCNC: 4.3 G/DL (ref 3.5–5)
ALP SERPL-CCNC: 74 U/L (ref 34–104)
ALT SERPL W P-5'-P-CCNC: 8 U/L (ref 7–52)
ANION GAP SERPL CALCULATED.3IONS-SCNC: 8 MMOL/L (ref 4–13)
AST SERPL W P-5'-P-CCNC: 10 U/L (ref 13–39)
BASOPHILS # BLD AUTO: 0.06 THOUSANDS/ÂΜL (ref 0–0.1)
BASOPHILS NFR BLD AUTO: 1 % (ref 0–1)
BILIRUB SERPL-MCNC: 0.28 MG/DL (ref 0.2–1)
BUN SERPL-MCNC: 12 MG/DL (ref 5–25)
CALCIUM SERPL-MCNC: 9.7 MG/DL (ref 8.4–10.2)
CARDIAC TROPONIN I PNL SERPL HS: <2 NG/L
CARDIAC TROPONIN I PNL SERPL HS: <2 NG/L
CHLORIDE SERPL-SCNC: 105 MMOL/L (ref 96–108)
CO2 SERPL-SCNC: 24 MMOL/L (ref 21–32)
CREAT SERPL-MCNC: 0.71 MG/DL (ref 0.6–1.3)
EOSINOPHIL # BLD AUTO: 0.14 THOUSAND/ÂΜL (ref 0–0.61)
EOSINOPHIL NFR BLD AUTO: 2 % (ref 0–6)
ERYTHROCYTE [DISTWIDTH] IN BLOOD BY AUTOMATED COUNT: 12.6 % (ref 11.6–15.1)
EXT PREGNANCY TEST URINE: NEGATIVE
EXT. CONTROL: NORMAL
GFR SERPL CREATININE-BSD FRML MDRD: 102 ML/MIN/1.73SQ M
GLUCOSE SERPL-MCNC: 89 MG/DL (ref 65–140)
HCT VFR BLD AUTO: 43.1 % (ref 34.8–46.1)
HGB BLD-MCNC: 14.2 G/DL (ref 11.5–15.4)
IMM GRANULOCYTES # BLD AUTO: 0.02 THOUSAND/UL (ref 0–0.2)
IMM GRANULOCYTES NFR BLD AUTO: 0 % (ref 0–2)
LYMPHOCYTES # BLD AUTO: 4.02 THOUSANDS/ÂΜL (ref 0.6–4.47)
LYMPHOCYTES NFR BLD AUTO: 42 % (ref 14–44)
MCH RBC QN AUTO: 30.9 PG (ref 26.8–34.3)
MCHC RBC AUTO-ENTMCNC: 32.9 G/DL (ref 31.4–37.4)
MCV RBC AUTO: 94 FL (ref 82–98)
MONOCYTES # BLD AUTO: 0.77 THOUSAND/ÂΜL (ref 0.17–1.22)
MONOCYTES NFR BLD AUTO: 8 % (ref 4–12)
NEUTROPHILS # BLD AUTO: 4.64 THOUSANDS/ÂΜL (ref 1.85–7.62)
NEUTS SEG NFR BLD AUTO: 47 % (ref 43–75)
NRBC BLD AUTO-RTO: 0 /100 WBCS
PLATELET # BLD AUTO: 352 THOUSANDS/UL (ref 149–390)
PMV BLD AUTO: 10.4 FL (ref 8.9–12.7)
POTASSIUM SERPL-SCNC: 3.9 MMOL/L (ref 3.5–5.3)
PROT SERPL-MCNC: 7.6 G/DL (ref 6.4–8.4)
RBC # BLD AUTO: 4.59 MILLION/UL (ref 3.81–5.12)
SODIUM SERPL-SCNC: 137 MMOL/L (ref 135–147)
WBC # BLD AUTO: 9.65 THOUSAND/UL (ref 4.31–10.16)

## 2024-04-19 PROCEDURE — 74174 CTA ABD&PLVS W/CONTRAST: CPT

## 2024-04-19 PROCEDURE — 84484 ASSAY OF TROPONIN QUANT: CPT

## 2024-04-19 PROCEDURE — 71275 CT ANGIOGRAPHY CHEST: CPT

## 2024-04-19 PROCEDURE — 81025 URINE PREGNANCY TEST: CPT | Performed by: EMERGENCY MEDICINE

## 2024-04-19 PROCEDURE — 99285 EMERGENCY DEPT VISIT HI MDM: CPT

## 2024-04-19 PROCEDURE — 99285 EMERGENCY DEPT VISIT HI MDM: CPT | Performed by: EMERGENCY MEDICINE

## 2024-04-19 PROCEDURE — 80053 COMPREHEN METABOLIC PANEL: CPT

## 2024-04-19 PROCEDURE — 96374 THER/PROPH/DIAG INJ IV PUSH: CPT

## 2024-04-19 PROCEDURE — 36415 COLL VENOUS BLD VENIPUNCTURE: CPT

## 2024-04-19 PROCEDURE — 85025 COMPLETE CBC W/AUTO DIFF WBC: CPT

## 2024-04-19 PROCEDURE — 93005 ELECTROCARDIOGRAM TRACING: CPT

## 2024-04-19 RX ORDER — KETOROLAC TROMETHAMINE 30 MG/ML
15 INJECTION, SOLUTION INTRAMUSCULAR; INTRAVENOUS ONCE
Status: COMPLETED | OUTPATIENT
Start: 2024-04-19 | End: 2024-04-19

## 2024-04-19 RX ORDER — ACETAMINOPHEN 325 MG/1
975 TABLET ORAL ONCE
Status: COMPLETED | OUTPATIENT
Start: 2024-04-19 | End: 2024-04-19

## 2024-04-19 RX ADMIN — IOHEXOL 100 ML: 350 INJECTION, SOLUTION INTRAVENOUS at 19:24

## 2024-04-19 RX ADMIN — KETOROLAC TROMETHAMINE 15 MG: 30 INJECTION, SOLUTION INTRAMUSCULAR; INTRAVENOUS at 21:01

## 2024-04-19 RX ADMIN — ACETAMINOPHEN 975 MG: 325 TABLET, FILM COATED ORAL at 21:01

## 2024-04-20 NOTE — ED ATTENDING ATTESTATION
4/19/2024  I, Rhina Denise MD, saw and evaluated the patient. I have discussed the patient with the resident/non-physician practitioner and agree with the resident's/non-physician practitioner's findings, Plan of Care, and MDM as documented in the resident's/non-physician practitioner's note, except where noted. All available labs and Radiology studies were reviewed.  I was present for key portions of any procedure(s) performed by the resident/non-physician practitioner and I was immediately available to provide assistance.       At this point I agree with the current assessment done in the Emergency Department.  I have conducted an independent evaluation of this patient a history and physical is as follows:    44-year-old female presenting for evaluation of left-sided chest pain.  Patient describes the pain as sharp, sudden in onset, radiating to her back and left arm with tingling in the thumb and volar aspect of the left forearm.  Also reports a mild to moderate in intensity left occipital headache that is since resolved without neck pain.  No recent head or neck trauma.  Denies shortness of breath.  No history of CAD or thromboembolism.    Physical Exam  Constitutional:       General: She is not in acute distress.     Appearance: She is well-developed. She is not diaphoretic.   HENT:      Head: Normocephalic and atraumatic.      Right Ear: External ear normal.      Left Ear: External ear normal.      Nose: Nose normal.   Eyes:      Conjunctiva/sclera: Conjunctivae normal.   Cardiovascular:      Rate and Rhythm: Normal rate and regular rhythm.      Pulses: Normal pulses.      Heart sounds: Normal heart sounds. No murmur heard.     No friction rub. No gallop.   Pulmonary:      Effort: Pulmonary effort is normal. No respiratory distress.      Breath sounds: Normal breath sounds. No wheezing or rales.   Abdominal:      General: Bowel sounds are normal. There is no distension.      Palpations: Abdomen is soft.       Tenderness: There is no abdominal tenderness. There is no guarding.   Musculoskeletal:         General: No deformity. Normal range of motion.      Cervical back: Normal range of motion and neck supple.      Right lower leg: No edema.      Left lower leg: No edema.      Comments: No calf swelling or tenderness   Skin:     General: Skin is warm and dry.   Neurological:      Mental Status: She is alert and oriented to person, place, and time.      Motor: No abnormal muscle tone.      Comments: Face symmetric, tongue midline.  Cranial nerves II through XII intact.  5/5 strength in the proximal and distal bilateral in upper and lower extremities.  Intact sensation to light touch in the right hemibody and left lower extremity.  Decreased sensation to light touch with paresthesias reported over the left thenar eminence and volar surface of the left forearm only.  Sensation to light touch is otherwise normal in the radial and ulnar nerve distributions of the left arm and hand.  Normal finger-nose, heel-to-shin bilaterally.  No pronator drift.  Clear speech.   Psychiatric:         Mood and Affect: Mood normal.           ED Course  ED Course as of 04/19/24 2004 Fri Apr 19, 2024 2003 I personally interpreted the patient's EKG which reveals rate of 99 bpm, normal axis, normal intervals, prominent S wave in lead III and aVF new from prior, T wave inversion in lead III and nonspecific T wave abnormality in lead aVF unchanged from prior, no ST segment deviation, similar to most recent prior EKG.  Initial troponin is undetectable, will obtain delta in 2 hours.  Given report of chest pain radiating to the back, dissection study will be obtained to rule out aortic dissection.  Patient is satting well on room air, no increased work of breathing, doubt PE.  CBC and CMP unremarkable.   CTA of the chest without evidence of aortic dissection. 2 hour troponin <2, doubt ACS. Symptoms are reproducible with palpation.       Critical  Care Time  Procedures

## 2024-04-20 NOTE — ED PROVIDER NOTES
History  Chief Complaint   Patient presents with    Chest Pain     Patient states sudden CP starting 30 minutes ago, pain in L shoulder and fullness in head. Patient states hand tingling. Patient took antacid 15 minutes ago. Patient denies cardiac hx besides tachycardia and HTN in pregnancy. Patients states fingers swollen, rings feel tight and can normally slide them off. Patient was getting nails done prior to this and felt like she was going to fall asleep in the chair.      Patient is a 44-year-old female with history of hypothyroidism and hypertension presents to the emergency department with sudden onset left-sided chest pain radiating through to her left shoulder blade.  Pain started while she was sitting getting her nails done. She has noticed that the pain also radiates up the left side of her neck and into the back of her head.  The pain is made worse with deep inhalation.  She also noticed some paresthesia and numbness feeling to her left hand mostly on the volar surface and on her volar wrist.  She denies nausea, diaphoresis, blurred vision, difficulty swallowing, difficulty walking/talking, and reports that she has never had this pain before.  Upon initial presentation patient's blood pressure is significantly elevated to 195/103.  She has taken no pain medications prior to arrival and reports that she does not want to take anything because she would like to feel the pain and see if changes.  She denies any trauma to the chest and reports that she has otherwise been well recently.        Prior to Admission Medications   Prescriptions Last Dose Informant Patient Reported? Taking?   ALPRAZolam (XANAX) 0.25 mg tablet   No No   Sig: Take 1 tablet (0.25 mg total) by mouth daily as needed for anxiety   Semaglutide-Weight Management (WEGOVY) 0.25 MG/0.5ML   No No   Sig: Inject 0.5 mL (0.25 mg total) under the skin once a week   albuterol (ProAir HFA) 90 mcg/act inhaler   No No   Sig: Inhale 2 puffs every 6  (six) hours as needed for wheezing   Patient not taking: Reported on 3/7/2024   brompheniramine-pseudoephedrine-DM 30-2-10 MG/5ML syrup   No No   Sig: Take 10 mL by mouth 3 (three) times a day as needed for congestion or cough   levothyroxine 112 mcg tablet   No No   Sig: Take 1 tablet (112 mcg total) by mouth daily in the early morning   ondansetron (ZOFRAN-ODT) 4 mg disintegrating tablet   No No   Sig: Take 1 tablet (4 mg total) by mouth every 8 (eight) hours as needed for nausea or vomiting   polymyxin b-trimethoprim (POLYTRIM) ophthalmic solution   No No   Sig: Administer 1 drop to both eyes every 4 (four) hours   triamcinolone (KENALOG) 0.1 % cream   No No   Sig: Apply topically 2 (two) times a day   Patient not taking: Reported on 9/1/2022   valACYclovir (VALTREX) 1,000 mg tablet   No No   Sig: TAKE 1 TABLET BY MOUTH TWICE A DAY FOR 3 DAYS      Facility-Administered Medications: None       Past Medical History:   Diagnosis Date    Anxiety     Hypothyroidism     Low back pain     Lyme disease     Migraine     Obesity     Polycystic ovarian syndrome     Tachycardia     Varicella     Vertigo     Viral gastroenteritis        Past Surgical History:   Procedure Laterality Date    APPENDECTOMY      SHOULDER SURGERY Left     with hardware    TOOTH EXTRACTION         Family History   Problem Relation Age of Onset    Hypertension Mother     Thyroid disease Mother     Hypertension Brother     Hyperlipidemia Brother     No Known Problems Brother     No Known Problems Son     No Known Problems Son     Diabetes Maternal Grandmother     Diabetes Paternal Grandmother     No Known Problems Maternal Aunt     No Known Problems Maternal Aunt     No Known Problems Maternal Aunt     No Known Problems Maternal Aunt     No Known Problems Maternal Aunt     Autism spectrum disorder Family     Bipolar disorder Family     Diabetes Family     Varicose Veins Family     Breast cancer Neg Hx     Colon cancer Neg Hx     Ovarian cancer Neg Hx      Uterine cancer Neg Hx     Cervical cancer Neg Hx      I have reviewed and agree with the history as documented.    E-Cigarette/Vaping    E-Cigarette Use Never User      E-Cigarette/Vaping Substances    Nicotine No     THC No     CBD No     Flavoring No     Other No     Unknown No      Social History     Tobacco Use    Smoking status: Never    Smokeless tobacco: Never   Vaping Use    Vaping status: Never Used   Substance Use Topics    Alcohol use: Yes     Alcohol/week: 3.0 standard drinks of alcohol     Types: 3 Standard drinks or equivalent per week     Comment: socially    Drug use: Never        Review of Systems   Constitutional:  Negative for chills and fever.   HENT:  Negative for congestion, ear pain and sore throat.    Eyes:  Negative for pain and visual disturbance.   Respiratory:  Positive for shortness of breath. Negative for cough and chest tightness.    Cardiovascular:  Positive for chest pain. Negative for palpitations.   Gastrointestinal:  Negative for abdominal pain, constipation, diarrhea, nausea and vomiting.   Genitourinary:  Negative for dysuria and hematuria.   Musculoskeletal:  Positive for back pain (L shoulder) and neck pain. Negative for arthralgias.   Skin:  Negative for color change and rash.   Neurological:  Positive for numbness (L arm/palm). Negative for dizziness, seizures, syncope, light-headedness and headaches.   Psychiatric/Behavioral:  Negative for confusion.    All other systems reviewed and are negative.      Physical Exam  ED Triage Vitals   Temperature Pulse Respirations Blood Pressure SpO2   04/19/24 1838 04/19/24 1826 04/19/24 1826 04/19/24 1826 04/19/24 1826   97.6 °F (36.4 °C) 101 18 (!) 195/103 99 %      Temp Source Heart Rate Source Patient Position - Orthostatic VS BP Location FiO2 (%)   04/19/24 1838 04/19/24 1826 04/19/24 1826 04/19/24 1826 --   Oral Monitor Lying Right arm       Pain Score       --                    Orthostatic Vital Signs  Vitals:    04/19/24 1830  04/19/24 1845 04/19/24 1915 04/19/24 2000   BP: (!) 195/103 168/85 160/88 144/79   Pulse: (!) 109 95 82 79   Patient Position - Orthostatic VS: Lying Lying Lying        Physical Exam  Vitals and nursing note reviewed.   Constitutional:       General: She is in acute distress (due to pain).      Appearance: Normal appearance. She is well-developed. She is not ill-appearing.   HENT:      Head: Normocephalic and atraumatic.      Right Ear: External ear normal.      Left Ear: External ear normal.      Mouth/Throat:      Pharynx: Oropharynx is clear. No oropharyngeal exudate or posterior oropharyngeal erythema.   Eyes:      General:         Right eye: No discharge.         Left eye: No discharge.      Extraocular Movements: Extraocular movements intact.      Conjunctiva/sclera: Conjunctivae normal.   Cardiovascular:      Rate and Rhythm: Normal rate and regular rhythm.      Pulses:           Radial pulses are 2+ on the right side and 2+ on the left side.        Dorsalis pedis pulses are 2+ on the right side and 2+ on the left side.      Heart sounds: Normal heart sounds. No murmur heard.  Pulmonary:      Effort: Pulmonary effort is normal. No respiratory distress.      Breath sounds: Normal breath sounds. No wheezing, rhonchi or rales.   Abdominal:      General: Abdomen is flat.      Palpations: Abdomen is soft.      Tenderness: There is no abdominal tenderness. There is no guarding or rebound.   Musculoskeletal:         General: No swelling or deformity. Normal range of motion.      Cervical back: Neck supple. No tenderness.   Skin:     General: Skin is warm and dry.      Capillary Refill: Capillary refill takes less than 2 seconds.   Neurological:      Mental Status: She is alert and oriented to person, place, and time.      GCS: GCS eye subscore is 4. GCS verbal subscore is 5. GCS motor subscore is 6.      Cranial Nerves: No cranial nerve deficit.      Motor: No weakness.      Comments: Decree sensation to the median  nerve distribution of the left arm         ED Medications  Medications   iohexol (OMNIPAQUE) 350 MG/ML injection (MULTI-DOSE) 100 mL (100 mL Intravenous Given 4/19/24 1924)   ketorolac (TORADOL) injection 15 mg (15 mg Intravenous Given 4/19/24 2101)   acetaminophen (TYLENOL) tablet 975 mg (975 mg Oral Given 4/19/24 2101)       Diagnostic Studies  Results Reviewed       Procedure Component Value Units Date/Time    HS Troponin I 2hr [[id number]] Collected: 04/19/24 2101    Lab Status: Final result Specimen: Blood from Arm, Left Updated: 04/19/24 2133     hs TnI 2hr <2 ng/L      Delta 2hr hsTnI --    POCT pregnancy, urine [[id number]]  (Normal) Resulted: 04/19/24 2022    Lab Status: Final result Updated: 04/19/24 2023     EXT Preg Test, Ur Negative     Control Valid    HS Troponin 0hr (reflex protocol) [[id number]]  (Normal) Collected: 04/19/24 1849    Lab Status: Final result Specimen: Blood from Arm, Left Updated: 04/19/24 1916     hs TnI 0hr <2 ng/L     Comprehensive metabolic panel [[id number]]  (Abnormal) Collected: 04/19/24 1849    Lab Status: Final result Specimen: Blood from Arm, Left Updated: 04/19/24 1909     Sodium 137 mmol/L      Potassium 3.9 mmol/L      Chloride 105 mmol/L      CO2 24 mmol/L      ANION GAP 8 mmol/L      BUN 12 mg/dL      Creatinine 0.71 mg/dL      Glucose 89 mg/dL      Calcium 9.7 mg/dL      AST 10 U/L      ALT 8 U/L      Alkaline Phosphatase 74 U/L      Total Protein 7.6 g/dL      Albumin 4.3 g/dL      Total Bilirubin 0.28 mg/dL      eGFR 102 ml/min/1.73sq m     Narrative:      National Kidney Disease Foundation guidelines for Chronic Kidney Disease (CKD):     Stage 1 with normal or high GFR (GFR > 90 mL/min/1.73 square meters)    Stage 2 Mild CKD (GFR = 60-89 mL/min/1.73 square meters)    Stage 3A Moderate CKD (GFR = 45-59 mL/min/1.73 square meters)    Stage 3B Moderate CKD (GFR = 30-44 mL/min/1.73 square meters)    Stage 4 Severe CKD (GFR = 15-29 mL/min/1.73 square meters)    Stage 5 End  Stage CKD (GFR <15 mL/min/1.73 square meters)  Note: GFR calculation is accurate only with a steady state creatinine    CBC and differential [[id number]] Collected: 04/19/24 1849    Lab Status: Final result Specimen: Blood from Arm, Left Updated: 04/19/24 1855     WBC 9.65 Thousand/uL      RBC 4.59 Million/uL      Hemoglobin 14.2 g/dL      Hematocrit 43.1 %      MCV 94 fL      MCH 30.9 pg      MCHC 32.9 g/dL      RDW 12.6 %      MPV 10.4 fL      Platelets 352 Thousands/uL      nRBC 0 /100 WBCs      Segmented % 47 %      Immature Grans % 0 %      Lymphocytes % 42 %      Monocytes % 8 %      Eosinophils Relative 2 %      Basophils Relative 1 %      Absolute Neutrophils 4.64 Thousands/µL      Absolute Immature Grans 0.02 Thousand/uL      Absolute Lymphocytes 4.02 Thousands/µL      Absolute Monocytes 0.77 Thousand/µL      Eosinophils Absolute 0.14 Thousand/µL      Basophils Absolute 0.06 Thousands/µL                    CTA dissection protocol chest/abdomen/pelvis   Final Result by Eagle Melton MD (04/19 2031)      No evidence of aortic dissection.               Workstation performed: [id number]               Procedures  ECG 12 Lead Documentation Only    Date/Time: 4/19/2024 6:30 PM    Performed by: Eagle Pierre DO  Authorized by: Eagle Pierre DO    Indications / Diagnosis:  Chest pain  ECG reviewed by me, the ED Provider: yes    Patient location:  ED  Previous ECG:     Previous ECG:  Compared to current    Similarity:  No change  Interpretation:     Interpretation: normal    Rate:     ECG rate:  99    ECG rate assessment: normal    Rhythm:     Rhythm: sinus rhythm    Ectopy:     Ectopy: none    QRS:     QRS axis:  Normal    QRS intervals:  Normal  Conduction:     Conduction: normal    ST segments:     ST segments:  Normal  T waves:     T waves: normal    ECG 12 Lead Documentation Only    Date/Time: 4/19/2024 8:20 PM    Performed by: Eagle Pierre DO  Authorized by: Eagle Pierre DO    Indications  / Diagnosis:  Chest pain  ECG reviewed by me, the ED Provider: yes    Patient location:  ED  Previous ECG:     Previous ECG:  Compared to current    Similarity:  No change  Interpretation:     Interpretation: normal    Rate:     ECG rate:  89    ECG rate assessment: normal    Rhythm:     Rhythm: sinus rhythm    Ectopy:     Ectopy: none    QRS:     QRS axis:  Normal    QRS intervals:  Normal  Conduction:     Conduction: normal    ST segments:     ST segments:  Normal  T waves:     T waves: normal          ED Course             HEART Risk Score      Flowsheet Row Most Recent Value   Heart Score Risk Calculator    History 1 Filed at: 04/19/2024 2201   ECG 0 Filed at: 04/19/2024 2201   Age 1 Filed at: 04/19/2024 2201   Risk Factors 1 Filed at: 04/19/2024 2201   Troponin 0 Filed at: 04/19/2024 2201   HEART Score 3 Filed at: 04/19/2024 2201                        SBIRT 22yo+      Flowsheet Row Most Recent Value   Initial Alcohol Screen: US AUDIT-C     1. How often do you have a drink containing alcohol? 1 Filed at: 04/19/2024 1825   2. How many drinks containing alcohol do you have on a typical day you are drinking?  0 Filed at: 04/19/2024 1825   3a. Male UNDER 65: How often do you have five or more drinks on one occasion? 0 Filed at: 04/19/2024 1825   3b. FEMALE Any Age, or MALE 65+: How often do you have 4 or more drinks on one occassion? 0 Filed at: 04/19/2024 1825   Audit-C Score 1 Filed at: 04/19/2024 1825   MARICARMEN: How many times in the past year have you...    Used an illegal drug or used a prescription medication for non-medical reasons? Never Filed at: 04/19/2024 1825                  Medical Decision Making  44F here with sudden onset left-sided chest pain radiating to her left shoulder posteriorly.  Also having some numbness of the right volar wrist, palm, and distal forearm.  On exam, patient is in distress due to pain.  She has some decreased sensation to the area noted above.  Pulses equal in all  extremities.    DDX including but not limited to: chest wall pain, costochondritis, pleurisy, pericarditis, myocarditis, PTX, pneumonia, GI etiology; doubt ACS or MI or dissection or PE or rhabdomyolysis.    Exam without evidence of volume overload so doubt heart failure. EKG without signs of active ischemia. Given the timing of pain to ER presentation, HS troponin <2 delta troponin 0 so doubt NSTEMI.   Presentation not consistent with acute PE (not noted on CTA), pneumothorax (not visualized on chest xr), thoracic aortic dissection (not seen on CTA), pericarditis, tamponade, pneumonia (no infectious symptoms, clear chest xr), myocarditis (no recent illness, neg trop).   Patient's pain is reproducible and consistent with musculoskeletal pain.  HEART score: 3 so plan to ; discharge patient home with PMD follow up and strict return precautions.    In the absence of additional concerning findings on physical exam, laboratory evaluation, ecg, or imaging patient is appropriate for discharge at this time.  Patient provided with informational handout that discusses symptom management and reasons to return to the emergency department.  Return precautions are discussed and the patient and/or family demonstrate understanding of the plan.  Their questions are all answered to their satisfaction and the patient is discharged.      Amount and/or Complexity of Data Reviewed  Labs: ordered.  Radiology: ordered.    Risk  OTC drugs.  Prescription drug management.          Disposition  Final diagnoses:   Chest wall pain     Time reflects when diagnosis was documented in both MDM as applicable and the Disposition within this note       Time User Action Codes Description Comment    4/19/2024  9:40 PM Eagle Pierre Add [R07.89] Chest wall pain           ED Disposition       ED Disposition   Discharge    Condition   Stable    Date/Time   Fri Apr 19, 2024 2140    Comment   Tiffany Tuttle discharge to home/self care.                    Follow-up Information    None         Discharge Medication List as of 4/19/2024  9:41 PM        CONTINUE these medications which have NOT CHANGED    Details   albuterol (ProAir HFA) 90 mcg/act inhaler Inhale 2 puffs every 6 (six) hours as needed for wheezing, Starting Sun 12/10/2023, Normal      ALPRAZolam (XANAX) 0.25 mg tablet Take 1 tablet (0.25 mg total) by mouth daily as needed for anxiety, Starting u 4/4/2024, Normal      brompheniramine-pseudoephedrine-DM 30-2-10 MG/5ML syrup Take 10 mL by mouth 3 (three) times a day as needed for congestion or cough, Starting Wed 4/3/2024, Normal      levothyroxine 112 mcg tablet Take 1 tablet (112 mcg total) by mouth daily in the early morning, Starting Thu 4/4/2024, Normal      ondansetron (ZOFRAN-ODT) 4 mg disintegrating tablet Take 1 tablet (4 mg total) by mouth every 8 (eight) hours as needed for nausea or vomiting, Starting u 4/4/2024, Normal      polymyxin b-trimethoprim (POLYTRIM) ophthalmic solution Administer 1 drop to both eyes every 4 (four) hours, Starting Thu 3/28/2024, Normal      Semaglutide-Weight Management (WEGOVY) 0.25 MG/0.5ML Inject 0.5 mL (0.25 mg total) under the skin once a week, Starting Thu 4/4/2024, Normal      triamcinolone (KENALOG) 0.1 % cream Apply topically 2 (two) times a day, Starting Thu 7/28/2022, Normal      valACYclovir (VALTREX) 1,000 mg tablet TAKE 1 TABLET BY MOUTH TWICE A DAY FOR 3 DAYS, Normal           No discharge procedures on file.    PDMP Review         Value Time User    PDMP Reviewed  Yes 4/4/2024  5:43 PM Radha Lino MD             ED Provider  Attending physically available and evaluated Tiffany Tuttle. I managed the patient along with the ED Attending.    Electronically Signed by           Eagle Pierre DO  04/19/24 1702

## 2024-04-20 NOTE — DISCHARGE INSTRUCTIONS
Read the attached information for more details and reasons to return to the emergency department    Utilize the following methods to help alleviate your pain:  - Take tylenol (1000mg) and Ibuprofen (400mg) no less than six hours apart  - Apply lidoderm patch (12h on, 12h off) or Voltaren gel to affected area

## 2024-04-21 LAB
ATRIAL RATE: 89 BPM
ATRIAL RATE: 99 BPM
P AXIS: 36 DEGREES
P AXIS: 43 DEGREES
PR INTERVAL: 144 MS
PR INTERVAL: 148 MS
QRS AXIS: 14 DEGREES
QRS AXIS: 20 DEGREES
QRSD INTERVAL: 72 MS
QRSD INTERVAL: 72 MS
QT INTERVAL: 350 MS
QT INTERVAL: 370 MS
QTC INTERVAL: 449 MS
QTC INTERVAL: 450 MS
T WAVE AXIS: -6 DEGREES
T WAVE AXIS: -8 DEGREES
VENTRICULAR RATE: 89 BPM
VENTRICULAR RATE: 99 BPM

## 2024-04-21 PROCEDURE — 93010 ELECTROCARDIOGRAM REPORT: CPT | Performed by: INTERNAL MEDICINE

## 2024-04-22 ENCOUNTER — VBI (OUTPATIENT)
Dept: FAMILY MEDICINE CLINIC | Facility: CLINIC | Age: 44
End: 2024-04-22

## 2024-04-22 NOTE — LETTER
[facility]  [address]  [place] [address]    Date: 04/24/24    Tiffany Tuttle  [address] [place] [address]    Dear Tiffany:                                                                                                                                Thank you for choosing [facility] emergency department for care.  Your primary care provider wants to make sure that your ongoing medical care is being addressed. If you require follow up care as a result of your emergency department visit, there are a few things the practice would like you to know.                As part of the network's continuing commitment to caring for our patients, we have added more same day appointments and have extended office hours to meet your medical needs. After hours, on-call physicians are available via your primary care provider's main office line.               We encourage you to contact our office prior to seeking treatment to discuss your symptoms with the medical staff.  Together, we can determine the correct course of action.  A majority of non-emergent conditions such as: common cold, flu-like symptoms, fevers, strains/sprains, dislocations, minor burns, cuts and animal bites can be treated at [facility] facilities. Diagnostic testing is available at some sites.               Of course, if you are experiencing a life threatening medical emergency call 911 or proceed directly to the nearest emergency room.    Your nearest [facility] facility is conveniently located at:    [facility]  [address] [address]  [phone]  SKIP THE WAIT  Conveniently offered at most [facility] locations  [place] your spot online at [email or web address] or on the [facility]    Sincerely,    [facility]  Dept: [phone]

## 2024-04-22 NOTE — TELEPHONE ENCOUNTER
04/22/24 11:43 AM    Patient contacted post ED visit, first outreach attempt made. Message was left for patient to return a call to the [facility] Department at [facility]: Phone [phone].    Thank you.  [facility] Chavez MA  [facility]

## 2024-04-23 NOTE — TELEPHONE ENCOUNTER
04/23/24 10:21 AM    Patient contacted post ED visit, second outreach attempt made. Message was left for patient to return a call to the [facility] Department at [facility]: Phone [phone].    Thank you.  [facility] Chavez MA  [facility]

## 2024-04-24 NOTE — TELEPHONE ENCOUNTER
04/24/24 10:28 AM    Patient contacted post ED visit, phone outreaches were unsuccessful and a MyChart letter has been sent to the patient as follow-up.    Thank you.  [facility] Chavez MA  [facility]

## 2024-04-29 DIAGNOSIS — B00.9 HSV-1 (HERPES SIMPLEX VIRUS 1) INFECTION: ICD-10-CM

## 2024-04-29 RX ORDER — VALACYCLOVIR HYDROCHLORIDE 1 G/1
TABLET, FILM COATED ORAL
Qty: 30 TABLET | Refills: 5 | Status: SHIPPED | OUTPATIENT
Start: 2024-04-29 | End: 2024-05-29

## 2024-05-10 DIAGNOSIS — B00.9 HSV-1 (HERPES SIMPLEX VIRUS 1) INFECTION: ICD-10-CM

## 2024-05-10 RX ORDER — VALACYCLOVIR HYDROCHLORIDE 1 G/1
TABLET, FILM COATED ORAL
Qty: 180 TABLET | Refills: 1 | Status: SHIPPED | OUTPATIENT
Start: 2024-05-10 | End: 2024-06-09

## 2024-05-25 DIAGNOSIS — E03.9 HYPOTHYROIDISM, UNSPECIFIED TYPE: ICD-10-CM

## 2024-05-25 RX ORDER — LEVOTHYROXINE SODIUM 112 UG/1
112 TABLET ORAL
Qty: 90 TABLET | Refills: 2 | Status: SHIPPED | OUTPATIENT
Start: 2024-05-25

## 2024-06-20 DIAGNOSIS — E03.9 HYPOTHYROIDISM, UNSPECIFIED TYPE: ICD-10-CM

## 2024-06-20 RX ORDER — LEVOTHYROXINE SODIUM 112 UG/1
112 TABLET ORAL
Qty: 90 TABLET | Refills: 1 | Status: SHIPPED | OUTPATIENT
Start: 2024-06-20

## 2024-07-24 DIAGNOSIS — E03.9 HYPOTHYROIDISM, UNSPECIFIED TYPE: ICD-10-CM

## 2024-07-24 RX ORDER — LEVOTHYROXINE SODIUM 112 UG/1
112 TABLET ORAL
Qty: 90 TABLET | Refills: 1 | Status: SHIPPED | OUTPATIENT
Start: 2024-07-24

## 2024-09-03 DIAGNOSIS — F43.22 ADJUSTMENT DISORDER WITH ANXIETY: ICD-10-CM

## 2024-09-04 DIAGNOSIS — E03.9 HYPOTHYROIDISM, UNSPECIFIED TYPE: ICD-10-CM

## 2024-09-04 RX ORDER — ALPRAZOLAM 0.25 MG
0.25 TABLET ORAL DAILY PRN
Qty: 30 TABLET | Refills: 0 | Status: SHIPPED | OUTPATIENT
Start: 2024-09-04

## 2024-09-04 NOTE — PROGRESS NOTES
Assessment/Plan:  Calcium 1000 mg (in divided doses-max 600 mg at one time) + 600-1000 IU Vit D daily.   Pap with high risk HPV Q 5 years, if normal. Due   Return to office in one month to evaluate vaginal wall cyst. Call office with any complaints of pain.   Annual diagnostic mammogram and ultrasound are already ordered and scheduled.   Monthly breast self exam encouraged.  Call your insurance company to verify coverage prior to completing any ordered tests.   Exercise 150-300 minutes per week minimum.   Colon cancer screening due .  Kegels 20 times twice daily.   Use silicone based lubricant with sex as needed. (Water based only for use with condoms or sexual toys.)  Return to office in one year or sooner, if needed.        1. Encntr for gyn exam (general) (routine) w abnormal findings  2. Vaginal wall cyst  3. Abnormal mammogram of right breast  4. BMI 40.0-44.9, adult ([facility])             Subjective:      Patient ID: Tiffany Tuttle is a 44 y.o. female.    HPI    Tiffany Tuttle is a 44 y.o.  ( 11 and 10 yo sons ) female who is here today for her annual visit. No gynecologic health concerns.   Last in office on 23 for annual exam.   Monthly menses x 7 days with heavy flow x 5 days then mod to light flow. Menses is acceptable.  Exercise- not regularly   Works FT as a  for a bank.     Tiffany Tuttle is sexually active with male partner/  of 13 years. Monogamous and feels safe in this relationship. Denies vaginal pain,bleeding or dryness.    She uses essure  for contraception.    She is not interested in STD screening today.   She denies vaginal discharge, itching or pelvic pain.   She has no urinary concerns, does not have incontinence.  No bowel concerns.  No breast concerns.     Last pap: 2022 normal with negative HR HPV   Mammogram:   02/15/2023 right breast asymmetry  3/29/23 right breast diagnostic mammogram and ultrasound-> calcified oil cyst.   2024  "diagnostic mammo and US->asymmetry in right breast which was stable.   Colonoscopy: 01/26/2023 10 year recall   DEXA scan: Not on file    Family history of cancer:   Cancer-related family history is negative for Breast cancer, Colon cancer, Ovarian cancer, Uterine cancer, and Cervical cancer.      The following portions of the patient's history were reviewed and updated as appropriate: allergies, current medications, past family history, past medical history, past social history, past surgical history, and problem list.    Review of Systems   Constitutional: Negative.  Negative for activity change, appetite change, chills, diaphoresis, fatigue, fever and unexpected weight change.   HENT:  Negative for congestion, dental problem, sneezing, sore throat and trouble swallowing.    Eyes:  Negative for visual disturbance.   Respiratory:  Negative for chest tightness and shortness of breath.    Cardiovascular:  Negative for chest pain and leg swelling.   Gastrointestinal:  Negative for abdominal pain, constipation, diarrhea, nausea and vomiting.   Genitourinary:  Negative for difficulty urinating, dyspareunia, dysuria, frequency, hematuria, menstrual problem, pelvic pain, urgency, vaginal bleeding, vaginal discharge and vaginal pain.   Musculoskeletal:  Negative for back pain and neck pain.   Skin: Negative.    Allergic/Immunologic: Negative.    Neurological:  Negative for weakness and headaches.   Hematological:  Negative for adenopathy.   Psychiatric/Behavioral: Negative.           Objective:      /82 (BP Location: Left arm, Patient Position: Sitting, Cuff Size: Large)   Pulse 102   Ht 5' 3\" (1.6 m)   Wt 103 kg (227 lb 12.8 oz)   LMP 08/26/2024   SpO2 98%   BMI 40.35 kg/m²          Physical Exam  Vitals and nursing note reviewed.   Constitutional:       Appearance: Normal appearance. She is well-developed.      Comments: BMI 40    HENT:      Head: Normocephalic and atraumatic.   Eyes:      General:         " Right eye: No discharge.         Left eye: No discharge.   Neck:      Thyroid: No thyromegaly.      Trachea: Trachea normal.   Cardiovascular:      Rate and Rhythm: Normal rate and regular rhythm.      Heart sounds: Normal heart sounds.   Pulmonary:      Effort: Pulmonary effort is normal.      Breath sounds: Normal breath sounds.   Chest:   Breasts:     Breasts are symmetrical.      Right: Normal. No inverted nipple, mass, nipple discharge, skin change or tenderness.      Left: Normal. No inverted nipple, mass, nipple discharge, skin change or tenderness.   Abdominal:      Palpations: Abdomen is soft.   Genitourinary:     General: Normal vulva.      Exam position: Lithotomy position.      Labia:         Right: No rash, tenderness, lesion or injury.         Left: No rash, tenderness, lesion or injury.       Urethra: No prolapse, urethral pain, urethral swelling or urethral lesion.      Vagina: No signs of injury and foreign body. No vaginal discharge, erythema, tenderness or bleeding.      Cervix: Normal.      Uterus: Normal.       Adnexa:         Right: No mass, tenderness or fullness.          Left: No mass, tenderness or fullness.        Rectum: No external hemorrhoid.      Comments: 1 cm movable posterior wall vaginal cyst.   Musculoskeletal:         General: Normal range of motion.      Cervical back: Normal range of motion and neck supple.   Lymphadenopathy:      Head:      Right side of head: No submental, submandibular or tonsillar adenopathy.      Left side of head: No submental, submandibular or tonsillar adenopathy.      Cervical: No cervical adenopathy.      Upper Body:      Right upper body: No supraclavicular or axillary adenopathy.      Left upper body: No supraclavicular or axillary adenopathy.      Lower Body: No right inguinal adenopathy. No left inguinal adenopathy.   Skin:     General: Skin is warm and dry.   Neurological:      Mental Status: She is alert and oriented to person, place, and time.    Psychiatric:         Mood and Affect: Mood normal.         Behavior: Behavior normal.

## 2024-09-05 ENCOUNTER — ANNUAL EXAM (OUTPATIENT)
Dept: OBGYN CLINIC | Facility: MEDICAL CENTER | Age: 44
End: 2024-09-05
Payer: COMMERCIAL

## 2024-09-05 VITALS
OXYGEN SATURATION: 98 % | HEART RATE: 102 BPM | HEIGHT: 63 IN | DIASTOLIC BLOOD PRESSURE: 82 MMHG | BODY MASS INDEX: 40.36 KG/M2 | SYSTOLIC BLOOD PRESSURE: 118 MMHG | WEIGHT: 227.8 LBS

## 2024-09-05 DIAGNOSIS — R92.8 ABNORMAL MAMMOGRAM OF RIGHT BREAST: ICD-10-CM

## 2024-09-05 DIAGNOSIS — Z01.411 ENCNTR FOR GYN EXAM (GENERAL) (ROUTINE) W ABNORMAL FINDINGS: Primary | ICD-10-CM

## 2024-09-05 DIAGNOSIS — N89.8 VAGINAL WALL CYST: ICD-10-CM

## 2024-09-05 PROCEDURE — S0612 ANNUAL GYNECOLOGICAL EXAMINA: HCPCS | Performed by: NURSE PRACTITIONER

## 2024-09-05 RX ORDER — LEVOTHYROXINE SODIUM 112 UG/1
112 TABLET ORAL
Qty: 90 TABLET | Refills: 1 | Status: SHIPPED | OUTPATIENT
Start: 2024-09-05

## 2024-09-05 NOTE — PATIENT INSTRUCTIONS
Calcium 1000 mg (in divided doses-max 600 mg at one time) + 600-1000 IU Vit D daily.   Pap with high risk HPV Q 5 years, if normal. Due 2027  Return to office in one month to evaluate vaginal wall cyst.   Annual diagnostic mammogram and ultrasound are already ordered and scheduled.   Monthly breast self exam encouraged.  Call your insurance company to verify coverage prior to completing any ordered tests.   Exercise 150-300 minutes per week minimum.   Colon cancer screening due 2032.  Kegels 20 times twice daily.   Use silicone based lubricant with sex as needed. (Water based only for use with condoms or sexual toys.)  Return to office in one year or sooner, if needed.

## 2024-10-08 DIAGNOSIS — F43.22 ADJUSTMENT DISORDER WITH ANXIETY: ICD-10-CM

## 2024-10-08 RX ORDER — ALPRAZOLAM 0.25 MG
0.25 TABLET ORAL DAILY PRN
Qty: 30 TABLET | Refills: 0 | Status: CANCELLED | OUTPATIENT
Start: 2024-10-08

## 2024-10-09 DIAGNOSIS — F43.22 ADJUSTMENT DISORDER WITH ANXIETY: ICD-10-CM

## 2024-10-09 RX ORDER — ALPRAZOLAM 0.25 MG
0.25 TABLET ORAL DAILY PRN
Qty: 30 TABLET | Refills: 0 | Status: CANCELLED | OUTPATIENT
Start: 2024-10-09

## 2024-10-09 NOTE — TELEPHONE ENCOUNTER
Patient was left a detailed message she is past due for a follow up . Needs to schedule an appointment.

## 2024-10-10 RX ORDER — ALPRAZOLAM 0.25 MG
0.25 TABLET ORAL DAILY PRN
Qty: 30 TABLET | Refills: 1 | Status: SHIPPED | OUTPATIENT
Start: 2024-10-10

## 2024-10-10 NOTE — TELEPHONE ENCOUNTER
Looks like patient is scheduled in Dec. For a 6 month follow up.     PD MP Last refill 09/04/2024 # [phone]

## 2024-10-23 ENCOUNTER — OFFICE VISIT (OUTPATIENT)
Dept: OBGYN CLINIC | Facility: MEDICAL CENTER | Age: 44
End: 2024-10-23
Payer: COMMERCIAL

## 2024-10-23 VITALS — SYSTOLIC BLOOD PRESSURE: 128 MMHG | DIASTOLIC BLOOD PRESSURE: 92 MMHG | WEIGHT: 230 LBS | BODY MASS INDEX: 40.74 KG/M2

## 2024-10-23 DIAGNOSIS — N89.8 VAGINAL WALL CYST: Primary | ICD-10-CM

## 2024-10-23 PROCEDURE — 99213 OFFICE O/P EST LOW 20 MIN: CPT | Performed by: NURSE PRACTITIONER

## 2024-10-23 NOTE — PATIENT INSTRUCTIONS
No change in vaginal wall cyst.   She can monitor and if any increase in size or if pain develops, call office for an appt.   Return to office for annual exam 9/25.

## 2024-10-23 NOTE — PROGRESS NOTES
Assessment/Plan:  No change in vaginal wall cyst.   She can monitor and if any increase in size or if pain develops, call office for an appt.   Return to office for annual exam .       1. Vaginal wall cyst             Subjective:      Patient ID: Tiffany Tuttle is a 44 y.o. female.    HPI    Tiffany Tuttle is a 44 y.o.  female who is here today for a follow up visit.  No health concerns.   /92. Asymptomatic.   Last in office on 24 for annual exam. Vaginal wall cyst noted at that time. No concerns related to this. Has not tried to palpate this cyst.     Monthly menses x 7 days with heavy flow x 5 days then mod to light flow. Menses is acceptable.     Tiffany Tuttle is sexually active with male partner/  of 13 years. Monogamous and feels safe in this relationship. Denies vaginal pain,bleeding or dryness.    She uses essure  for contraception.    She is not interested in STD screening today.   She denies vaginal discharge, itching or pelvic pain.     The following portions of the patient's history were reviewed and updated as appropriate: allergies, current medications, past family history, past medical history, past social history, past surgical history, and problem list.    Review of Systems   Constitutional: Negative.    Gastrointestinal:  Negative for constipation and diarrhea.   Genitourinary:  Negative for difficulty urinating, dyspareunia, frequency, genital sores, menstrual problem, pelvic pain, urgency, vaginal bleeding, vaginal discharge and vaginal pain.   Musculoskeletal: Negative.    Hematological:  Negative for adenopathy.   Psychiatric/Behavioral: Negative.           Objective:      /92 (BP Location: Left arm, Patient Position: Sitting, Cuff Size: Large)   Wt 104 kg (230 lb)   LMP 2024 (Approximate)   BMI 40.74 kg/m²          Physical Exam  Vitals and nursing note reviewed.   Constitutional:       Appearance: Normal appearance. She is well-developed.    Genitourinary:     General: Normal vulva.      Exam position: Lithotomy position.      Labia:         Right: No rash, tenderness, lesion or injury.         Left: No rash, tenderness, lesion or injury.       Urethra: No prolapse, urethral pain, urethral swelling or urethral lesion.      Vagina: No signs of injury and foreign body. No vaginal discharge, erythema, tenderness, bleeding, lesions or prolapsed vaginal walls.      Cervix: Normal.      Uterus: Normal.       Adnexa:         Right: No mass, tenderness or fullness.          Left: No mass, tenderness or fullness.        Rectum: No external hemorrhoid.      Comments: Posterior (toward rectovaginal wall) mobile vaginal wall cyst approx 1 cm in size 2 cm from vaginal opening   Lymphadenopathy:      Lower Body: No right inguinal adenopathy. No left inguinal adenopathy.   Skin:     General: Skin is warm and dry.   Neurological:      Mental Status: She is alert and oriented to person, place, and time.   Psychiatric:         Mood and Affect: Mood normal.         Behavior: Behavior normal.

## 2024-11-18 DIAGNOSIS — F43.22 ADJUSTMENT DISORDER WITH ANXIETY: ICD-10-CM

## 2024-11-20 RX ORDER — ALPRAZOLAM 0.25 MG/1
0.25 TABLET ORAL DAILY PRN
Qty: 30 TABLET | Refills: 0 | Status: SHIPPED | OUTPATIENT
Start: 2024-11-20

## 2024-12-02 ENCOUNTER — RA CDI HCC (OUTPATIENT)
Dept: OTHER | Facility: HOSPITAL | Age: 44
End: 2024-12-02

## 2024-12-07 ENCOUNTER — APPOINTMENT (OUTPATIENT)
Dept: LAB | Facility: CLINIC | Age: 44
End: 2024-12-07
Payer: COMMERCIAL

## 2024-12-07 DIAGNOSIS — E03.9 HYPOTHYROIDISM, UNSPECIFIED TYPE: ICD-10-CM

## 2024-12-07 DIAGNOSIS — E78.2 MIXED HYPERLIPIDEMIA: ICD-10-CM

## 2024-12-07 LAB
ALBUMIN SERPL BCG-MCNC: 4.1 G/DL (ref 3.5–5)
ALP SERPL-CCNC: 69 U/L (ref 34–104)
ALT SERPL W P-5'-P-CCNC: 24 U/L (ref 7–52)
ANION GAP SERPL CALCULATED.3IONS-SCNC: 5 MMOL/L (ref 4–13)
AST SERPL W P-5'-P-CCNC: 16 U/L (ref 13–39)
BILIRUB SERPL-MCNC: 0.46 MG/DL (ref 0.2–1)
BUN SERPL-MCNC: 12 MG/DL (ref 5–25)
CALCIUM SERPL-MCNC: 9 MG/DL (ref 8.4–10.2)
CHLORIDE SERPL-SCNC: 107 MMOL/L (ref 96–108)
CHOLEST SERPL-MCNC: 196 MG/DL (ref ?–200)
CO2 SERPL-SCNC: 27 MMOL/L (ref 21–32)
CREAT SERPL-MCNC: 0.62 MG/DL (ref 0.6–1.3)
GFR SERPL CREATININE-BSD FRML MDRD: 108 ML/MIN/1.73SQ M
GLUCOSE P FAST SERPL-MCNC: 104 MG/DL (ref 65–99)
HDLC SERPL-MCNC: 54 MG/DL
LDLC SERPL CALC-MCNC: 120 MG/DL (ref 0–100)
NONHDLC SERPL-MCNC: 142 MG/DL
POTASSIUM SERPL-SCNC: 4.2 MMOL/L (ref 3.5–5.3)
PROT SERPL-MCNC: 6.8 G/DL (ref 6.4–8.4)
SODIUM SERPL-SCNC: 139 MMOL/L (ref 135–147)
T4 FREE SERPL-MCNC: 0.81 NG/DL (ref 0.61–1.12)
TRIGL SERPL-MCNC: 110 MG/DL (ref ?–150)
TSH SERPL DL<=0.05 MIU/L-ACNC: 5.56 UIU/ML (ref 0.45–4.5)

## 2024-12-07 PROCEDURE — 36415 COLL VENOUS BLD VENIPUNCTURE: CPT

## 2024-12-07 PROCEDURE — 84443 ASSAY THYROID STIM HORMONE: CPT

## 2024-12-07 PROCEDURE — 80061 LIPID PANEL: CPT

## 2024-12-07 PROCEDURE — 84439 ASSAY OF FREE THYROXINE: CPT

## 2024-12-09 ENCOUNTER — RESULTS FOLLOW-UP (OUTPATIENT)
Dept: FAMILY MEDICINE CLINIC | Facility: CLINIC | Age: 44
End: 2024-12-09

## 2024-12-09 ENCOUNTER — OFFICE VISIT (OUTPATIENT)
Dept: FAMILY MEDICINE CLINIC | Facility: CLINIC | Age: 44
End: 2024-12-09
Payer: COMMERCIAL

## 2024-12-09 VITALS
DIASTOLIC BLOOD PRESSURE: 78 MMHG | HEIGHT: 63 IN | BODY MASS INDEX: 40.75 KG/M2 | HEART RATE: 92 BPM | OXYGEN SATURATION: 97 % | WEIGHT: 230 LBS | SYSTOLIC BLOOD PRESSURE: 122 MMHG

## 2024-12-09 DIAGNOSIS — E78.2 MIXED HYPERLIPIDEMIA: ICD-10-CM

## 2024-12-09 DIAGNOSIS — E03.9 HYPOTHYROIDISM, UNSPECIFIED TYPE: Primary | ICD-10-CM

## 2024-12-09 DIAGNOSIS — B00.9 HSV-1 (HERPES SIMPLEX VIRUS 1) INFECTION: ICD-10-CM

## 2024-12-09 DIAGNOSIS — F43.22 ADJUSTMENT DISORDER WITH ANXIETY: ICD-10-CM

## 2024-12-09 DIAGNOSIS — R73.9 ELEVATED BLOOD SUGAR: ICD-10-CM

## 2024-12-09 PROCEDURE — 99214 OFFICE O/P EST MOD 30 MIN: CPT | Performed by: FAMILY MEDICINE

## 2024-12-09 RX ORDER — ALPRAZOLAM 0.25 MG/1
0.25 TABLET ORAL DAILY PRN
Qty: 30 TABLET | Refills: 2 | Status: SHIPPED | OUTPATIENT
Start: 2024-12-09

## 2024-12-09 RX ORDER — LEVOTHYROXINE SODIUM 112 UG/1
112 TABLET ORAL
Qty: 90 TABLET | Refills: 1 | Status: SHIPPED | OUTPATIENT
Start: 2024-12-09

## 2024-12-09 NOTE — ASSESSMENT & PLAN NOTE
Cholesterol improved significantly with lifestyle changes.  Continue same.  Continue monitoring with metabolic labs at 6-month interval.

## 2024-12-09 NOTE — ASSESSMENT & PLAN NOTE
TSH is borderline but patient is asymptomatic.  Prefers to continue the same dose.  She has recently started biotin supplement.  I have advised her to avoid taking biotin supplement for at least 3 days before she goes for thyroid testing.  Continue levothyroxine 112 mcg.

## 2024-12-09 NOTE — ASSESSMENT & PLAN NOTE
Responded well to Valtrex 1000 mg twice daily for which she takes for 3 days intermittently as needed only.  Continue same.

## 2024-12-09 NOTE — PROGRESS NOTES
Subjective:      Patient ID: Tiffany Tuttle is a 44 y.o. female.    HPI  Patient is also following up on her chronic medical problems.  Has history of hyperlipidemia, hypothyroidism, anxiety.  Metabolic labs reviewed with patient, TSH is borderline high.  Patient is on levothyroxine 112 mcg, asymptomatic.  Patient has recently started taking biotin supplements.  Patient has history of dyslipidemia, cholesterol panel improved with lifestyle changes.  Anxiety stable on intermittent Xanax.  Past Medical History:   Diagnosis Date    Anxiety     Hypothyroidism     Low back pain     Lyme disease     Migraine     Obesity     Polycystic ovarian syndrome     Tachycardia     Varicella     Vertigo     Viral gastroenteritis        Family History   Problem Relation Age of Onset    Hypertension Mother     Thyroid disease Mother     Hypertension Brother     Hyperlipidemia Brother     No Known Problems Brother     Diabetes Maternal Grandmother     Diabetes Paternal Grandmother     No Known Problems Son     No Known Problems Son     No Known Problems Maternal Aunt     No Known Problems Maternal Aunt     No Known Problems Maternal Aunt     No Known Problems Maternal Aunt     No Known Problems Maternal Aunt     Autism spectrum disorder Family     Bipolar disorder Family     Diabetes Family     Varicose Veins Family     Breast cancer Neg Hx     Colon cancer Neg Hx     Ovarian cancer Neg Hx     Uterine cancer Neg Hx     Cervical cancer Neg Hx        Past Surgical History:   Procedure Laterality Date    APPENDECTOMY      SHOULDER SURGERY Left     with hardware    TOOTH EXTRACTION          reports that she has never smoked. She has never used smokeless tobacco. She reports current alcohol use of about 3.0 standard drinks of alcohol per week. She reports that she does not use drugs.      Current Outpatient Medications:     albuterol (ProAir HFA) 90 mcg/act inhaler, Inhale 2 puffs every 6 (six) hours as needed for wheezing, Disp: 8.5 g,  "Rfl: 0    ALPRAZolam (XANAX) 0.25 mg tablet, Take 1 tablet (0.25 mg total) by mouth daily as needed for anxiety, Disp: 30 tablet, Rfl: 2    levothyroxine 112 mcg tablet, Take 1 tablet (112 mcg total) by mouth daily in the early morning, Disp: 90 tablet, Rfl: 1    brompheniramine-pseudoephedrine-DM 30-2-10 MG/5ML syrup, Take 10 mL by mouth 3 (three) times a day as needed for congestion or cough (Patient not taking: Reported on 12/9/2024), Disp: 120 mL, Rfl: 0    ondansetron (ZOFRAN-ODT) 4 mg disintegrating tablet, Take 1 tablet (4 mg total) by mouth every 8 (eight) hours as needed for nausea or vomiting (Patient not taking: Reported on 12/9/2024), Disp: 30 tablet, Rfl: 1    valACYclovir (VALTREX) 1,000 mg tablet, TAKE 1 TABLET BY MOUTH TWICE A DAY FOR 3 DAYS, Disp: 180 tablet, Rfl: 1    The following portions of the patient's history were reviewed and updated as appropriate: allergies, current medications, past family history, past medical history, past social history, past surgical history and problem list.    Review of Systems   Constitutional: Negative.    Respiratory: Negative.     Cardiovascular: Negative.            Objective:    /78   Pulse 92   Ht 5' 3\" (1.6 m)   Wt 104 kg (230 lb)   SpO2 97%   BMI 40.74 kg/m²      Physical Exam  Constitutional:       Appearance: Normal appearance.   Cardiovascular:      Heart sounds: Normal heart sounds.   Pulmonary:      Breath sounds: Normal breath sounds.   Musculoskeletal:      Right lower leg: No edema.      Left lower leg: No edema.           Recent Results (from the past 6 weeks)   Comprehensive metabolic panel    Collection Time: 12/07/24  7:19 AM   Result Value Ref Range    Sodium 139 135 - 147 mmol/L    Potassium 4.2 3.5 - 5.3 mmol/L    Chloride 107 96 - 108 mmol/L    CO2 27 21 - 32 mmol/L    ANION GAP 5 4 - 13 mmol/L    BUN 12 5 - 25 mg/dL    Creatinine 0.62 0.60 - 1.30 mg/dL    Glucose, Fasting 104 (H) 65 - 99 mg/dL    Calcium 9.0 8.4 - 10.2 mg/dL    " AST 16 13 - 39 U/L    ALT 24 7 - 52 U/L    Alkaline Phosphatase 69 34 - 104 U/L    Total Protein 6.8 6.4 - 8.4 g/dL    Albumin 4.1 3.5 - 5.0 g/dL    Total Bilirubin 0.46 0.20 - 1.00 mg/dL    eGFR 108 ml/min/1.73sq m   Lipid panel    Collection Time: 12/07/24  7:19 AM   Result Value Ref Range    Cholesterol 196 See Comment mg/dL    Triglycerides 110 See Comment mg/dL    HDL, Direct 54 >=50 mg/dL    LDL Calculated 120 (H) 0 - 100 mg/dL    Non-HDL-Chol (CHOL-HDL) 142 mg/dl   TSH, 3rd generation with Free T4 reflex    Collection Time: 12/07/24  7:19 AM   Result Value Ref Range    TSH 3RD GENERATON 5.564 (H) 0.450 - 4.500 uIU/mL   T4, free    Collection Time: 12/07/24  7:19 AM   Result Value Ref Range    Free T4 0.81 0.61 - 1.12 ng/dL       Assessment/Plan:    No problem-specific Assessment & Plan notes found for this encounter.           Problem List Items Addressed This Visit          Endocrine    Hypothyroidism - Primary    TSH is borderline but patient is asymptomatic.  Prefers to continue the same dose.  She has recently started biotin supplement.  I have advised her to avoid taking biotin supplement for at least 3 days before she goes for thyroid testing.  Continue levothyroxine 112 mcg.         Relevant Medications    levothyroxine 112 mcg tablet    Other Relevant Orders    TSH, 3rd generation with Free T4 reflex    TSH, 3rd generation with Free T4 reflex       Behavioral Health    Adjustment disorder with anxiety    Symptoms responding well to intermittent use of Xanax 0.25 milligram.  Continue same.  PDMP checked.  Medication refill called in.         Relevant Medications    ALPRAZolam (XANAX) 0.25 mg tablet       Other    Elevated blood sugar    A1c- normal. Continue with low carb diet. Continue monitoring.          Relevant Orders    Comprehensive metabolic panel    Hemoglobin A1C    Mixed hyperlipidemia    Cholesterol improved significantly with lifestyle changes.  Continue same.  Continue monitoring with  metabolic labs at 6-month interval.         Relevant Orders    Lipid panel    HSV-1 (herpes simplex virus 1) infection    Responded well to Valtrex 1000 mg twice daily for which she takes for 3 days intermittently as needed only.  Continue same.

## 2025-01-22 RX ORDER — TRIAMCINOLONE ACETONIDE 1 MG/G
CREAM TOPICAL 2 TIMES DAILY
Qty: 30 G | Refills: 0 | OUTPATIENT
Start: 2025-01-22

## 2025-02-25 ENCOUNTER — OFFICE VISIT (OUTPATIENT)
Dept: URGENT CARE | Age: 45
End: 2025-02-25
Payer: COMMERCIAL

## 2025-02-25 VITALS
RESPIRATION RATE: 18 BRPM | DIASTOLIC BLOOD PRESSURE: 74 MMHG | OXYGEN SATURATION: 98 % | SYSTOLIC BLOOD PRESSURE: 132 MMHG | HEART RATE: 120 BPM | TEMPERATURE: 97.1 F

## 2025-02-25 DIAGNOSIS — J02.9 SORE THROAT: Primary | ICD-10-CM

## 2025-02-25 DIAGNOSIS — R05.1 ACUTE COUGH: ICD-10-CM

## 2025-02-25 DIAGNOSIS — R09.81 NASAL CONGESTION: ICD-10-CM

## 2025-02-25 LAB — S PYO AG THROAT QL: NEGATIVE

## 2025-02-25 PROCEDURE — 99213 OFFICE O/P EST LOW 20 MIN: CPT

## 2025-02-25 PROCEDURE — 87880 STREP A ASSAY W/OPTIC: CPT

## 2025-02-25 RX ORDER — METHYLPREDNISOLONE 4 MG/1
TABLET ORAL
Qty: 21 TABLET | Refills: 0 | Status: SHIPPED | OUTPATIENT
Start: 2025-02-25

## 2025-02-26 NOTE — PROGRESS NOTES
[facility]        NAME: Tiffany Tuttle is a 45 y.o. female  : 1980    MRN: [id number]  DATE: 2025  TIME: 7:57 PM      Assessment and Plan     Sore throat [J02.9]  1. Sore throat  POCT rapid ANTIGEN strepA      2. Acute cough        3. Nasal congestion  methylPREDNISolone 4 MG tablet therapy pack          Rapid strep negative. Throat culture sent. Will hold on antibiotics at this time      Patient Instructions     Hydration and rest.  Acetaminophen and ibuprofen for pain relief and fever reduction.   Mucinex OTC for cough and congestion.  Honey for cough.   Take steroids as directed. Recommend to take them in the morning and with food.   Recommend humidifier.   Recommend throat lozenges, anesthetic spray such as chloraseptic, and gargling warm salt water for throat irritation.   Throat culture sent.   Use the [facility] MyChart to obtain lab results.  PCP follow up in 3-5 days.   Go to an emergency department if difficulty breathing occurs or if symptoms worsen.    Chief Complaint     Chief Complaint   Patient presents with    Cold Like Symptoms     Friday night started with congestion and headache  Sore throat, headache, earache in right ear  Has tried Flonase and zyrtec          History of Present Illness     Patient is a 45-year-old female who presents with congestion and headache that started four days ago. Reports sore throat. Reports productive cough. Reports right ear pain. States her children were also recently sick. Denies asthma history. Denies diabetic history. Reports has been taking Flonase and zrytec.     Sore Throat   This is a new problem. The current episode started in the past 7 days. The problem has been rapidly worsening. The pain is worse on the right side. There has been no fever. The pain is at a severity of 7/10. The pain is moderate. Associated symptoms include congestion, coughing, ear pain, a hoarse voice, a plugged ear sensation, neck pain, swollen glands  and trouble swallowing. Pertinent negatives include no abdominal pain, diarrhea, drooling, ear discharge, headaches, shortness of breath, stridor or vomiting.       Review of Systems     Review of Systems   Constitutional:  Positive for chills.   HENT:  Positive for congestion, ear pain, hoarse voice, sore throat and trouble swallowing. Negative for drooling and ear discharge.    Respiratory:  Positive for cough. Negative for shortness of breath and stridor.    Gastrointestinal:  Negative for abdominal pain, diarrhea and vomiting.   Musculoskeletal:  Positive for neck pain.   Neurological:  Negative for headaches.   All other systems reviewed and are negative.        Current Medications       Current Outpatient Medications:     ALPRAZolam (XANAX) 0.25 mg tablet, Take 1 tablet (0.25 mg total) by mouth daily as needed for anxiety, Disp: 30 tablet, Rfl: 2    levothyroxine 112 mcg tablet, Take 1 tablet (112 mcg total) by mouth daily in the early morning, Disp: 90 tablet, Rfl: 1    methylPREDNISolone 4 MG tablet therapy pack, Use as directed on package, Disp: 21 tablet, Rfl: 0    albuterol (ProAir HFA) 90 mcg/act inhaler, Inhale 2 puffs every 6 (six) hours as needed for wheezing (Patient not taking: Reported on 2/25/2025), Disp: 8.5 g, Rfl: 0    brompheniramine-pseudoephedrine-DM 30-2-10 MG/5ML syrup, Take 10 mL by mouth 3 (three) times a day as needed for congestion or cough (Patient not taking: Reported on 9/5/2024), Disp: 120 mL, Rfl: 0    ondansetron (ZOFRAN-ODT) 4 mg disintegrating tablet, Take 1 tablet (4 mg total) by mouth every 8 (eight) hours as needed for nausea or vomiting (Patient not taking: Reported on 9/5/2024), Disp: 30 tablet, Rfl: 1    valACYclovir (VALTREX) 1,000 mg tablet, TAKE 1 TABLET BY MOUTH TWICE A DAY FOR 3 DAYS, Disp: 180 tablet, Rfl: 1    Current Allergies     Allergies as of 02/25/2025 - Reviewed 02/25/2025   Allergen Reaction Noted    Shellfish-derived products - food allergy GI Intolerance  01/22/2021              The following portions of the patient's history were reviewed and updated as appropriate: allergies, current medications, past family history, past medical history, past social history, past surgical history and problem list.     Past Medical History:   Diagnosis Date    Anxiety     Hypothyroidism     Low back pain     Lyme disease     Migraine     Obesity     Polycystic ovarian syndrome     Tachycardia     Varicella     Vertigo     Viral gastroenteritis        Past Surgical History:   Procedure Laterality Date    APPENDECTOMY      SHOULDER SURGERY Left     with hardware    TOOTH EXTRACTION         Family History   Problem Relation Age of Onset    Hypertension Mother     Thyroid disease Mother     Hypertension Brother     Hyperlipidemia Brother     No Known Problems Brother     Diabetes Maternal Grandmother     Diabetes Paternal Grandmother     No Known Problems Son     No Known Problems Son     No Known Problems Maternal Aunt     No Known Problems Maternal Aunt     No Known Problems Maternal Aunt     No Known Problems Maternal Aunt     No Known Problems Maternal Aunt     Autism spectrum disorder Family     Bipolar disorder Family     Diabetes Family     Varicose Veins Family     Breast cancer Neg Hx     Colon cancer Neg Hx     Ovarian cancer Neg Hx     Uterine cancer Neg Hx     Cervical cancer Neg Hx          Medications have been verified.        Objective     /74   Pulse (!) 120   Temp (!) 97.1 °F (36.2 °C)   Resp 18   SpO2 98%   No LMP recorded.         Physical Exam     Physical Exam  Vitals and nursing note reviewed.   Constitutional:       General: She is awake. She is not in acute distress.     Appearance: Normal appearance. She is not ill-appearing, toxic-appearing or diaphoretic.   HENT:      Right Ear: Tympanic membrane, ear canal and external ear normal.      Left Ear: Tympanic membrane, ear canal and external ear normal.      Nose: Mucosal edema and congestion present.       Mouth/Throat:      Lips: Pink.      Mouth: Mucous membranes are moist.      Pharynx: Oropharynx is clear. Uvula midline.   Cardiovascular:      Rate and Rhythm: Normal rate.      Pulses: Normal pulses.      Heart sounds: Normal heart sounds, S1 normal and S2 normal.   Pulmonary:      Effort: Pulmonary effort is normal.      Breath sounds: Normal breath sounds and air entry. No stridor, decreased air movement or transmitted upper airway sounds. No decreased breath sounds, wheezing, rhonchi or rales.   Skin:     General: Skin is warm.      Capillary Refill: Capillary refill takes less than 2 seconds.   Neurological:      Mental Status: She is alert.   Psychiatric:         Mood and Affect: Mood normal.         Behavior: Behavior normal.         Thought Content: Thought content normal.         Judgment: Judgment normal.

## 2025-02-26 NOTE — PATIENT INSTRUCTIONS
Hydration and rest.  Acetaminophen and ibuprofen for pain relief and fever reduction.   Mucinex OTC for cough and congestion.  Honey for cough.   Take steroids as directed. Recommend to take them in the morning and with food.   Recommend humidifier.   Recommend throat lozenges, anesthetic spray such as chloraseptic, and gargling warm salt water for throat irritation.   Throat culture sent.   Use the [facility] MyChart to obtain lab results.  PCP follow up in 3-5 days.   Go to an emergency department if difficulty breathing occurs or if symptoms worsen.

## 2025-03-25 ENCOUNTER — APPOINTMENT (OUTPATIENT)
Dept: LAB | Facility: CLINIC | Age: 45
End: 2025-03-25
Payer: COMMERCIAL

## 2025-03-25 ENCOUNTER — OFFICE VISIT (OUTPATIENT)
Dept: FAMILY MEDICINE CLINIC | Facility: CLINIC | Age: 45
End: 2025-03-25
Payer: COMMERCIAL

## 2025-03-25 VITALS
OXYGEN SATURATION: 97 % | HEIGHT: 63 IN | SYSTOLIC BLOOD PRESSURE: 122 MMHG | BODY MASS INDEX: 41.11 KG/M2 | HEART RATE: 91 BPM | WEIGHT: 232 LBS | DIASTOLIC BLOOD PRESSURE: 72 MMHG

## 2025-03-25 DIAGNOSIS — F43.22 ADJUSTMENT DISORDER WITH ANXIETY: ICD-10-CM

## 2025-03-25 DIAGNOSIS — B00.9 HSV-1 (HERPES SIMPLEX VIRUS 1) INFECTION: ICD-10-CM

## 2025-03-25 DIAGNOSIS — Z00.00 ENCOUNTER FOR SCREENING AND PREVENTATIVE CARE: Primary | ICD-10-CM

## 2025-03-25 DIAGNOSIS — R73.9 ELEVATED BLOOD SUGAR: ICD-10-CM

## 2025-03-25 DIAGNOSIS — E03.9 HYPOTHYROIDISM, UNSPECIFIED TYPE: ICD-10-CM

## 2025-03-25 DIAGNOSIS — E78.2 MIXED HYPERLIPIDEMIA: ICD-10-CM

## 2025-03-25 PROBLEM — R11.0 NAUSEA: Status: RESOLVED | Noted: 2024-04-04 | Resolved: 2025-03-25

## 2025-03-25 LAB — TSH SERPL DL<=0.05 MIU/L-ACNC: 4.38 UIU/ML (ref 0.45–4.5)

## 2025-03-25 PROCEDURE — 99214 OFFICE O/P EST MOD 30 MIN: CPT | Performed by: FAMILY MEDICINE

## 2025-03-25 RX ORDER — ALPRAZOLAM 0.25 MG
0.25 TABLET ORAL DAILY PRN
Qty: 30 TABLET | Refills: 0 | Status: SHIPPED | OUTPATIENT
Start: 2025-03-25

## 2025-03-25 NOTE — PROGRESS NOTES
New Patient Outpatient Note    HPI:     Tiffany Tuttle, 45 y.o. female  presents today as a new patient and to establish care.  The patient presents today to make sure that she is able to get a follow-up refills for her medications.  Unfortunately she has run out of her Xanax recently which she has family issues going on with her father.  He has just been placed on hospice. Previously she was on Xanax for anxiety and stress. She also takes Synthroid for her hypothyroidism which is monitored by primary care provider.  Unfortunately she was previously seeing Dr. Lino who left the office.    Family Hx  Up-to-date in chart    Past Medical History:   Diagnosis Date    Anxiety     Hypothyroidism     Low back pain     Lyme disease     Migraine     Nausea 04/04/2024    Obesity     Polycystic ovarian syndrome     Preventative health care 02/13/2020    Tachycardia     Varicella     Vertigo     Viral gastroenteritis         Past Surgical History:   Procedure Laterality Date    APPENDECTOMY      SHOULDER SURGERY Left     with hardware    TOOTH EXTRACTION            Current Outpatient Medications:     ALPRAZolam (XANAX) 0.25 mg tablet, Take 1 tablet (0.25 mg total) by mouth daily as needed for anxiety, Disp: 30 tablet, Rfl: 0    levothyroxine 112 mcg tablet, Take 1 tablet (112 mcg total) by mouth daily in the early morning, Disp: 90 tablet, Rfl: 1    valACYclovir (VALTREX) 1,000 mg tablet, TAKE 1 TABLET BY MOUTH TWICE A DAY FOR 3 DAYS, Disp: 180 tablet, Rfl: 1     SOCIAL:   Rent/Own:  Own  Currently living with: 2 children,   Stable food: Yes  Safe At Home: Yes  Hobbies: No hobbies  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   HENT:  Positive for congestion, postnasal drip and rhinorrhea. Negative for ear discharge, ear pain, hearing loss, sinus pressure, sinus pain, sneezing and sore throat.    Eyes:  Positive for visual disturbance (glasses for distance).   Respiratory:  Negative for cough, chest tightness and shortness of breath.    Cardiovascular:  Negative for chest pain and palpitations.   Gastrointestinal:  Negative for abdominal pain, blood in stool, constipation, diarrhea, nausea and vomiting.   Genitourinary:  Negative for dysuria and frequency.   Allergic/Immunologic: Positive for environmental allergies.   Neurological:  Positive for headaches (stress). Negative for dizziness and light-headedness.          OBJECTIVE  Vitals:    03/25/25 1141   BP: 122/72   Pulse: 91   SpO2: 97%        Physical Exam  Constitutional:       General: She is not in acute distress.     Appearance: Normal appearance. She is obese. She is not ill-appearing, toxic-appearing or diaphoretic.   HENT:      Head: Normocephalic and atraumatic.      Right Ear: Tympanic membrane, ear canal and external ear normal. There is no impacted cerumen.      Left Ear: Tympanic membrane, ear canal and external ear normal. There is no impacted cerumen.      Nose: Nose normal. No congestion or rhinorrhea.      Mouth/Throat:      Mouth: Mucous membranes are moist.      Pharynx: Oropharynx is clear. No oropharyngeal exudate or posterior oropharyngeal erythema.   Eyes:      General:         Right eye: No discharge.         Left eye: No discharge.      Pupils: Pupils are equal, round, and reactive to light.   Cardiovascular:      Rate and Rhythm: Normal rate and regular rhythm.      Heart sounds: Normal heart sounds. No murmur heard.     No friction rub. No  gallop.   Pulmonary:      Effort: Pulmonary effort is normal. No respiratory distress.      Breath sounds: Normal breath sounds. No stridor. No wheezing, rhonchi or rales.   Abdominal:      General: Bowel sounds are normal. There is no distension.      Palpations: Abdomen is soft.      Tenderness: There is no abdominal tenderness.   Musculoskeletal:      Cervical back: No tenderness.   Lymphadenopathy:      Cervical: No cervical adenopathy.   Skin:     General: Skin is warm.      Capillary Refill: Capillary refill takes less than 2 seconds.   Neurological:      Mental Status: She is alert.      Motor: No weakness (5/5 in all 4 extremities).        ASSESSMENT AND PLAN   Carrie was seen today for establish care.  Diagnoses and all orders for this visit:    Encounter for screening and preventative care  Patient presents to reestablish with new PCP.  Will obtain labs to follow-up for chronic conditions and also history of elevated fasting sugar and elevated LDL.  Labs placed.    Adjustment disorder with anxiety  History of general anxiety disorder/adjustment disorder along with stress at home.  Patient was previously placed on this medication by prior PCP. Will continue medication currently with acute issues.  Will follow up with patient and use after acute issues/ family issues.  -     ALPRAZolam (XANAX) 0.25 mg tablet; Take 1 tablet (0.25 mg total) by mouth daily as needed for anxiety    Hypothyroidism, unspecified type  History of hypothyroidism. Currently on dose of 112mcg daily.  She has been taking appropriately and recently obtained labs this morning for follow up.     Mixed hyperlipidemia  Lipid panel recommended by prior PCP.  Obtained this morning.  Will determine next steps once labs have returned and patient is able to discuss her health without worry of her father being ill.     Elevated blood sugar  Follow up A1c recommended by prior PCP to evaluate for elevated fasting sugar.  Will await labs to determine  next steps.  Will review with patient once patient is able to discuss her health without worry of her father being ill.    HSV-1 (herpes simplex virus 1) infection  History of intermittent infections.  Only requires medication with outbreaks.  No active infection currently.          DO Ling Galeas [facility]  3/25/2025 12:54 PM

## 2025-04-02 ENCOUNTER — HOSPITAL ENCOUNTER (OUTPATIENT)
Dept: MAMMOGRAPHY | Facility: CLINIC | Age: 45
Discharge: HOME | End: 2025-04-02
Payer: COMMERCIAL

## 2025-04-02 ENCOUNTER — HOSPITAL ENCOUNTER (OUTPATIENT)
Dept: ULTRASOUND IMAGING | Facility: CLINIC | Age: 45
Discharge: HOME | End: 2025-04-02
Payer: COMMERCIAL

## 2025-04-02 ENCOUNTER — RESULTS FOLLOW-UP (OUTPATIENT)
Dept: OBGYN CLINIC | Facility: MEDICAL CENTER | Age: 45
End: 2025-04-02

## 2025-04-02 VITALS — HEIGHT: 63 IN | BODY MASS INDEX: 41.11 KG/M2 | WEIGHT: 232 LBS

## 2025-04-02 DIAGNOSIS — R92.8 ABNORMAL MAMMOGRAM: ICD-10-CM

## 2025-04-02 PROCEDURE — 77066 DX MAMMO INCL CAD BI: CPT

## 2025-04-02 PROCEDURE — G0279 TOMOSYNTHESIS, MAMMO: HCPCS

## 2025-04-02 PROCEDURE — 76642 ULTRASOUND BREAST LIMITED: CPT

## 2025-04-03 NOTE — RESULT ENCOUNTER NOTE
Diagnostic mammogram and ultrasound  show a stable right breast asymmetry.   Post  traumatic oil cyst noted in right breast   Normal findings in left breast.   Continue with monthly breast self exam, annual clinical breast exam and annual mammogram.

## 2025-05-21 DIAGNOSIS — F43.22 ADJUSTMENT DISORDER WITH ANXIETY: ICD-10-CM

## 2025-05-21 NOTE — TELEPHONE ENCOUNTER
Reason for call:   [x] Refill   [] Prior Auth  [] Other:     Office:   [x] PCP/Provider -   [] Specialty/Provider -     Medication:     ALPRAZolam (XANAX) 0.25 mg tablet       Dose/Frequency:  Take 1 tablet (0.25 mg total) by mouth daily     Quantity: 30 tablet    Pharmacy: [facility]/pharmacy #[id number] - [place] -     Local Pharmacy   Does the patient have enough for 3 days?   [x] Yes   [] No - Send as HP to POD    Mail Away Pharmacy   Does the patient have enough for 10 days?   [] Yes   [] No - Send as HP to POD

## 2025-05-22 RX ORDER — ALPRAZOLAM 0.25 MG
0.25 TABLET ORAL DAILY PRN
Qty: 30 TABLET | Refills: 0 | Status: SHIPPED | OUTPATIENT
Start: 2025-05-22

## 2025-05-22 NOTE — TELEPHONE ENCOUNTER
Patient has been scheduled with me in September.  This really should be sooner if I am to continue to prescribe this medication monthly.     DO Ling [facility]  5/22/2025 8:07 AM

## 2025-06-05 ENCOUNTER — TRANSCRIBE ORDERS (OUTPATIENT)
Facility: HOSPITAL | Age: 45
End: 2025-06-05

## 2025-06-05 DIAGNOSIS — Z00.6 ENCOUNTER FOR EXAMINATION FOR NORMAL COMPARISON OR CONTROL IN CLINICAL RESEARCH PROGRAM: Primary | ICD-10-CM

## 2025-06-16 ENCOUNTER — OFFICE VISIT (OUTPATIENT)
Dept: URGENT CARE | Facility: MEDICAL CENTER | Age: 45
End: 2025-06-16
Payer: COMMERCIAL

## 2025-06-16 ENCOUNTER — APPOINTMENT (OUTPATIENT)
Dept: URGENT CARE | Facility: MEDICAL CENTER | Age: 45
End: 2025-06-16
Payer: COMMERCIAL

## 2025-06-16 VITALS
OXYGEN SATURATION: 97 % | HEART RATE: 113 BPM | WEIGHT: 234.25 LBS | SYSTOLIC BLOOD PRESSURE: 132 MMHG | BODY MASS INDEX: 41.5 KG/M2 | RESPIRATION RATE: 16 BRPM | DIASTOLIC BLOOD PRESSURE: 82 MMHG | TEMPERATURE: 98.8 F

## 2025-06-16 DIAGNOSIS — J06.9 ACUTE URI: ICD-10-CM

## 2025-06-16 DIAGNOSIS — R09.81 SINUS CONGESTION: Primary | ICD-10-CM

## 2025-06-16 PROCEDURE — 99214 OFFICE O/P EST MOD 30 MIN: CPT | Performed by: PHYSICIAN ASSISTANT

## 2025-06-16 RX ORDER — PREDNISONE 20 MG/1
40 TABLET ORAL DAILY
Qty: 10 TABLET | Refills: 0 | Status: SHIPPED | OUTPATIENT
Start: 2025-06-16 | End: 2025-06-21

## 2025-06-16 NOTE — PATIENT INSTRUCTIONS
Congestion   Prednisone once daily x 5 days  Humidifier in room, steam from the shower  Follow up with PCP in 3-5 days.  Proceed to  ER if symptoms worsen.

## 2025-06-16 NOTE — PROGRESS NOTES
[facility]        NAME: Tiffany Tuttle is a 45 y.o. female  : 1980    MRN: [id number]  DATE: 2025  TIME: 9:58 AM    Assessment and Plan   Sinus congestion [R09.81]  1. Sinus congestion  predniSONE 20 mg tablet      2. Acute URI              Patient Instructions     Congestion   Prednisone once daily x 5 days  Humidifier in room, steam from the shower  Follow up with PCP in 3-5 days.  Proceed to  ER if symptoms worsen.    Chief Complaint     Chief Complaint   Patient presents with    Cold Like Symptoms     2-3 days sore throat, ear aches congestion, fatigue, body aches. No fever or chills. Sinus pressure. PND. No cough. Taking zyrtec D and nyquil. Exposed to family members with similar sx.          History of Present Illness       46 y/o female who presents c/o congestion, bilateral ear pain, runny nose, clogged ear sensation x 4 days. States she has used over the counter medication with temporary relief. Denies chest pain, shortness of breath, fever, chills    Sore Throat   This is a new problem. The current episode started yesterday. The problem has been unchanged. Neither side of throat is experiencing more pain than the other. There has been no fever. The pain is at a severity of 8/10. The pain is moderate. Associated symptoms include congestion, ear pain, a plugged ear sensation and neck pain. Pertinent negatives include no abdominal pain, coughing, diarrhea, drooling, ear discharge, headaches, hoarse voice, shortness of breath, stridor, swollen glands, trouble swallowing or vomiting.       Review of Systems   Review of Systems   HENT:  Positive for congestion, ear pain and sore throat. Negative for drooling, ear discharge, hoarse voice and trouble swallowing.    Respiratory:  Negative for cough, shortness of breath and stridor.    Gastrointestinal:  Negative for abdominal pain, diarrhea and vomiting.   Musculoskeletal:  Positive for neck pain.   Neurological:  Negative for  headaches.         Current Medications     Current Medications[1]    Current Allergies     Allergies as of 06/16/2025 - Reviewed 06/16/2025   Allergen Reaction Noted    Shellfish-derived products - food allergy GI Intolerance 01/22/2021            The following portions of the patient's history were reviewed and updated as appropriate: allergies, current medications, past family history, past medical history, past social history, past surgical history and problem list.     Past Medical History[2]    Past Surgical History[3]    Family History[4]      Medications have been verified.        Objective   /82   Pulse (!) 113   Temp 98.8 °F (37.1 °C)   Resp 16   Wt 106 kg (234 lb 4 oz)   SpO2 97%   BMI 41.50 kg/m²        Physical Exam     Physical Exam  Constitutional:       General: She is not in acute distress.     Appearance: Normal appearance. She is well-developed. She is not diaphoretic.   HENT:      Head: Normocephalic and atraumatic.      Right Ear: Hearing, tympanic membrane, ear canal and external ear normal.      Left Ear: Hearing, tympanic membrane, ear canal and external ear normal.      Nose: Rhinorrhea present.      Mouth/Throat:      Pharynx: Uvula midline.     Cardiovascular:      Rate and Rhythm: Normal rate and regular rhythm.      Heart sounds: Normal heart sounds.   Pulmonary:      Effort: Pulmonary effort is normal. No respiratory distress.      Breath sounds: Normal breath sounds. No stridor. No wheezing, rhonchi or rales.   Chest:      Chest wall: No tenderness.     Musculoskeletal:      Cervical back: Normal range of motion and neck supple.   Lymphadenopathy:      Cervical: Cervical adenopathy present.     Neurological:      Mental Status: She is alert.                        [1]   Current Outpatient Medications:     ALPRAZolam (XANAX) 0.25 mg tablet, Take 1 tablet (0.25 mg total) by mouth daily as needed for anxiety, Disp: 30 tablet, Rfl: 0    levothyroxine 112 mcg tablet, Take 1  tablet (112 mcg total) by mouth daily in the early morning, Disp: 90 tablet, Rfl: 1    predniSONE 20 mg tablet, Take 2 tablets (40 mg total) by mouth daily for 5 days, Disp: 10 tablet, Rfl: 0    valACYclovir (VALTREX) 1,000 mg tablet, TAKE 1 TABLET BY MOUTH TWICE A DAY FOR 3 DAYS, Disp: 180 tablet, Rfl: 1  [2]   Past Medical History:  Diagnosis Date    Anxiety     Hypothyroidism     Low back pain     Lyme disease     Migraine     Nausea 04/04/2024    Obesity     Polycystic ovarian syndrome     Preventative health care 02/13/2020    Tachycardia     Varicella     Vertigo     Viral gastroenteritis    [3]   Past Surgical History:  Procedure Laterality Date    APPENDECTOMY      SHOULDER SURGERY Left     with hardware    TOOTH EXTRACTION     [4]   Family History  Problem Relation Name Age of Onset    Hypertension Mother Rosemary Rene     Thyroid disease Mother Rosemary Rene     Hypertension Brother Tevin     Hyperlipidemia Brother Tevin     No Known Problems Brother      Diabetes Maternal Grandmother Carito     Diabetes Paternal Grandmother Sydnee     No Known Problems Son garcia     No Known Problems Son amelia     No Known Problems Maternal Aunt      No Known Problems Maternal Aunt      No Known Problems Maternal Aunt      No Known Problems Maternal Aunt      No Known Problems Maternal Aunt      Autism spectrum disorder Family      Bipolar disorder Family      Diabetes Family      Varicose Veins Family      Breast cancer Neg Hx      Colon cancer Neg Hx      Ovarian cancer Neg Hx      Uterine cancer Neg Hx      Cervical cancer Neg Hx

## 2025-06-18 ENCOUNTER — OFFICE VISIT (OUTPATIENT)
Dept: URGENT CARE | Facility: CLINIC | Age: 45
End: 2025-06-18
Payer: COMMERCIAL

## 2025-06-18 VITALS
OXYGEN SATURATION: 98 % | DIASTOLIC BLOOD PRESSURE: 101 MMHG | TEMPERATURE: 98 F | RESPIRATION RATE: 16 BRPM | SYSTOLIC BLOOD PRESSURE: 163 MMHG | HEART RATE: 120 BPM

## 2025-06-18 DIAGNOSIS — F43.22 ADJUSTMENT DISORDER WITH ANXIETY: ICD-10-CM

## 2025-06-18 DIAGNOSIS — J06.9 UPPER RESPIRATORY TRACT INFECTION, UNSPECIFIED TYPE: Primary | ICD-10-CM

## 2025-06-18 PROCEDURE — 99213 OFFICE O/P EST LOW 20 MIN: CPT | Performed by: NURSE PRACTITIONER

## 2025-06-18 RX ORDER — BROMPHENIRAMINE MALEATE, PSEUDOEPHEDRINE HYDROCHLORIDE, AND DEXTROMETHORPHAN HYDROBROMIDE 2; 30; 10 MG/5ML; MG/5ML; MG/5ML
10 SYRUP ORAL 3 TIMES DAILY PRN
Qty: 150 ML | Refills: 0 | Status: SHIPPED | OUTPATIENT
Start: 2025-06-18

## 2025-06-18 RX ORDER — ALPRAZOLAM 0.25 MG
0.25 TABLET ORAL DAILY PRN
Qty: 30 TABLET | Refills: 0 | Status: SHIPPED | OUTPATIENT
Start: 2025-06-20

## 2025-06-18 RX ORDER — ALPRAZOLAM 0.25 MG
0.25 TABLET ORAL DAILY PRN
Qty: 30 TABLET | Refills: 0 | OUTPATIENT
Start: 2025-06-18

## 2025-06-18 NOTE — TELEPHONE ENCOUNTER
Last obtained on 5/22/2025.  Therefore early but requesting within proper limits.  Will make sure that the patient is able to obtain it at the proper timing.     DO Ling Galeas [facility]  6/18/2025 4:39 PM

## 2025-06-18 NOTE — PROGRESS NOTES
[facility]        NAME: Tiffany Tuttle is a 45 y.o. female  : 1980    MRN: [id number]  DATE: 2025  TIME: 10:50 AM    Assessment and Plan   Upper respiratory tract infection, unspecified type [J06.9]  1. Upper respiratory tract infection, unspecified type  brompheniramine-pseudoephedrine-DM 30-2-10 MG/5ML syrup            Patient Instructions     Take med as prescribed   Follow up with PCP in 3-5 days.  Proceed to  ER if symptoms worsen.    If tests have been performed at [facility], our office will contact you with results if changes need to be made to the care plan discussed with you at the visit.  You can review your full results on [facility].    Chief Complaint     Chief Complaint   Patient presents with    URI     Pt reports cold like symptoms since Saturday, states seen on Monday for same, given Prednisone, states bilateral ear pain and cough. Taking Prednisone and Zyrtec-D.         History of Present Illness       Sore Throat   This is a new problem. The current episode started in the past 7 days. The problem has been rapidly worsening. Neither side of throat is experiencing more pain than the other. There has been no fever. The pain is at a severity of 8/10. The pain is moderate. Associated symptoms include congestion, ear pain, a plugged ear sensation and neck pain. Pertinent negatives include no abdominal pain, coughing, diarrhea, drooling, ear discharge, headaches, hoarse voice, shortness of breath, stridor, swollen glands, trouble swallowing or vomiting.   Requesting for bromfed, which she states has worked well for her in the past when having similar symptoms.     Review of Systems   Review of Systems   HENT:  Positive for congestion, ear pain and sore throat. Negative for drooling, ear discharge, hoarse voice and trouble swallowing.    Respiratory:  Negative for cough, shortness of breath and stridor.    Gastrointestinal:  Negative for abdominal pain, diarrhea and  vomiting.   Musculoskeletal:  Positive for neck pain.   Neurological:  Negative for headaches.         Current Medications     Current Medications[1]    Current Allergies     Allergies as of 06/18/2025 - Reviewed 06/18/2025   Allergen Reaction Noted    Shellfish-derived products - food allergy GI Intolerance 01/22/2021            The following portions of the patient's history were reviewed and updated as appropriate: allergies, current medications, past family history, past medical history, past social history, past surgical history and problem list.     Past Medical History[2]    Past Surgical History[3]    Family History[4]      Medications have been verified.        Objective   BP (!) 163/101   Pulse (!) 120   Temp 98 °F (36.7 °C)   Resp 16   SpO2 98%   No LMP recorded.       Physical Exam     Physical Exam  Constitutional:       General: She is not in acute distress.     Appearance: She is not ill-appearing.   HENT:      Head:      Comments: NTTP of the sinuses     Right Ear: Tympanic membrane normal.      Left Ear: Tympanic membrane normal.      Nose: Rhinorrhea present.      Mouth/Throat:      Pharynx: No posterior oropharyngeal erythema.      Comments: Mild post nasal drip    Cardiovascular:      Rate and Rhythm: Regular rhythm.      Heart sounds: Normal heart sounds.   Pulmonary:      Effort: Pulmonary effort is normal.      Breath sounds: Normal breath sounds.                        [1]   Current Outpatient Medications:     brompheniramine-pseudoephedrine-DM 30-2-10 MG/5ML syrup, Take 10 mL by mouth 3 (three) times a day as needed for congestion or cough, Disp: 150 mL, Rfl: 0    ALPRAZolam (XANAX) 0.25 mg tablet, Take 1 tablet (0.25 mg total) by mouth daily as needed for anxiety, Disp: 30 tablet, Rfl: 0    levothyroxine 112 mcg tablet, Take 1 tablet (112 mcg total) by mouth daily in the early morning, Disp: 90 tablet, Rfl: 1    predniSONE 20 mg tablet, Take 2 tablets (40 mg total) by mouth daily for 5  days, Disp: 10 tablet, Rfl: 0    valACYclovir (VALTREX) 1,000 mg tablet, TAKE 1 TABLET BY MOUTH TWICE A DAY FOR 3 DAYS, Disp: 180 tablet, Rfl: 1  [2]   Past Medical History:  Diagnosis Date    Anxiety     Hypothyroidism     Low back pain     Lyme disease     Migraine     Nausea 04/04/2024    Obesity     Polycystic ovarian syndrome     Preventative health care 02/13/2020    Tachycardia     Varicella     Vertigo     Viral gastroenteritis    [3]   Past Surgical History:  Procedure Laterality Date    APPENDECTOMY      SHOULDER SURGERY Left     with hardware    TOOTH EXTRACTION     [4]   Family History  Problem Relation Name Age of Onset    Hypertension Mother Rosemary Rene     Thyroid disease Mother Rosemary Rene     Hypertension Brother Tevin     Hyperlipidemia Brother Tevin     No Known Problems Brother      Diabetes Maternal Grandmother Carito     Diabetes Paternal Grandmother Sydnee     No Known Problems Son garcia     No Known Problems Son amelia     No Known Problems Maternal Aunt      No Known Problems Maternal Aunt      No Known Problems Maternal Aunt      No Known Problems Maternal Aunt      No Known Problems Maternal Aunt      Autism spectrum disorder Family      Bipolar disorder Family      Diabetes Family      Varicose Veins Family      Breast cancer Neg Hx      Colon cancer Neg Hx      Ovarian cancer Neg Hx      Uterine cancer Neg Hx      Cervical cancer Neg Hx

## 2025-07-22 ENCOUNTER — PATIENT MESSAGE (OUTPATIENT)
Dept: FAMILY MEDICINE CLINIC | Facility: CLINIC | Age: 45
End: 2025-07-22

## 2025-07-22 NOTE — PATIENT COMMUNICATION
Paperwork given to Kyle up front to file for .     DO Ling [facility]  7/22/2025 5:57 PM